# Patient Record
Sex: FEMALE | Race: WHITE | NOT HISPANIC OR LATINO | Employment: FULL TIME | URBAN - METROPOLITAN AREA
[De-identification: names, ages, dates, MRNs, and addresses within clinical notes are randomized per-mention and may not be internally consistent; named-entity substitution may affect disease eponyms.]

---

## 2022-06-07 ENCOUNTER — HOSPITAL ENCOUNTER (EMERGENCY)
Facility: HOSPITAL | Age: 42
Discharge: HOME | End: 2022-06-07
Attending: EMERGENCY MEDICINE
Payer: COMMERCIAL

## 2022-06-07 ENCOUNTER — APPOINTMENT (EMERGENCY)
Dept: CT IMAGING | Facility: HOSPITAL | Age: 42
End: 2022-06-07
Payer: COMMERCIAL

## 2022-06-07 ENCOUNTER — APPOINTMENT (EMERGENCY)
Dept: RADIOLOGY | Facility: HOSPITAL | Age: 42
End: 2022-06-07
Attending: EMERGENCY MEDICINE
Payer: COMMERCIAL

## 2022-06-07 VITALS
DIASTOLIC BLOOD PRESSURE: 78 MMHG | SYSTOLIC BLOOD PRESSURE: 121 MMHG | BODY MASS INDEX: 29.26 KG/M2 | OXYGEN SATURATION: 98 % | TEMPERATURE: 98.1 F | WEIGHT: 159 LBS | RESPIRATION RATE: 18 BRPM | HEIGHT: 62 IN | HEART RATE: 90 BPM

## 2022-06-07 DIAGNOSIS — N20.0 KIDNEY STONE ON LEFT SIDE: ICD-10-CM

## 2022-06-07 DIAGNOSIS — R11.10 VOMITING: ICD-10-CM

## 2022-06-07 DIAGNOSIS — R07.9 CHEST PAIN: Primary | ICD-10-CM

## 2022-06-07 LAB
ALBUMIN SERPL BCP-MCNC: 3.5 G/DL (ref 3.5–5)
ALP SERPL-CCNC: 64 U/L (ref 46–116)
ALT SERPL W P-5'-P-CCNC: 33 U/L (ref 12–78)
ANION GAP SERPL CALCULATED.3IONS-SCNC: 9 MMOL/L (ref 4–13)
AST SERPL W P-5'-P-CCNC: 22 U/L (ref 5–45)
BASOPHILS # BLD AUTO: 0.05 THOUSANDS/ΜL (ref 0–0.1)
BASOPHILS NFR BLD AUTO: 1 % (ref 0–1)
BILIRUB SERPL-MCNC: 0.6 MG/DL (ref 0.2–1)
BUN SERPL-MCNC: 10 MG/DL (ref 5–25)
CALCIUM SERPL-MCNC: 8.5 MG/DL (ref 8.3–10.1)
CARDIAC TROPONIN I PNL SERPL HS: <2 NG/L
CHLORIDE SERPL-SCNC: 106 MMOL/L (ref 100–108)
CO2 SERPL-SCNC: 23 MMOL/L (ref 21–32)
CREAT SERPL-MCNC: 0.8 MG/DL (ref 0.6–1.3)
D DIMER PPP FEU-MCNC: 0.36 UG/ML FEU
EOSINOPHIL # BLD AUTO: 0.08 THOUSAND/ΜL (ref 0–0.61)
EOSINOPHIL NFR BLD AUTO: 2 % (ref 0–6)
ERYTHROCYTE [DISTWIDTH] IN BLOOD BY AUTOMATED COUNT: 13.1 % (ref 11.6–15.1)
GFR SERPL CREATININE-BSD FRML MDRD: 91 ML/MIN/1.73SQ M
GLUCOSE SERPL-MCNC: 102 MG/DL (ref 65–140)
HCT VFR BLD AUTO: 43.7 % (ref 34.8–46.1)
HGB BLD-MCNC: 14 G/DL (ref 11.5–15.4)
IMM GRANULOCYTES # BLD AUTO: 0.01 THOUSAND/UL (ref 0–0.2)
IMM GRANULOCYTES NFR BLD AUTO: 0 % (ref 0–2)
LIPASE SERPL-CCNC: 146 U/L (ref 73–393)
LYMPHOCYTES # BLD AUTO: 1.46 THOUSANDS/ΜL (ref 0.6–4.47)
LYMPHOCYTES NFR BLD AUTO: 32 % (ref 14–44)
MCH RBC QN AUTO: 30 PG (ref 26.8–34.3)
MCHC RBC AUTO-ENTMCNC: 32 G/DL (ref 31.4–37.4)
MCV RBC AUTO: 94 FL (ref 82–98)
MONOCYTES # BLD AUTO: 0.41 THOUSAND/ΜL (ref 0.17–1.22)
MONOCYTES NFR BLD AUTO: 9 % (ref 4–12)
NEUTROPHILS # BLD AUTO: 2.51 THOUSANDS/ΜL (ref 1.85–7.62)
NEUTS SEG NFR BLD AUTO: 56 % (ref 43–75)
NRBC BLD AUTO-RTO: 0 /100 WBCS
PLATELET # BLD AUTO: 324 THOUSANDS/UL (ref 149–390)
PMV BLD AUTO: 9.7 FL (ref 8.9–12.7)
POTASSIUM SERPL-SCNC: 3.8 MMOL/L (ref 3.5–5.3)
PROT SERPL-MCNC: 7.4 G/DL (ref 6.4–8.2)
RBC # BLD AUTO: 4.66 MILLION/UL (ref 3.81–5.12)
SODIUM SERPL-SCNC: 138 MMOL/L (ref 136–145)
WBC # BLD AUTO: 4.52 THOUSAND/UL (ref 4.31–10.16)

## 2022-06-07 PROCEDURE — 71045 X-RAY EXAM CHEST 1 VIEW: CPT

## 2022-06-07 PROCEDURE — 99285 EMERGENCY DEPT VISIT HI MDM: CPT

## 2022-06-07 PROCEDURE — 84484 ASSAY OF TROPONIN QUANT: CPT | Performed by: EMERGENCY MEDICINE

## 2022-06-07 PROCEDURE — 99285 EMERGENCY DEPT VISIT HI MDM: CPT | Performed by: EMERGENCY MEDICINE

## 2022-06-07 PROCEDURE — 80053 COMPREHEN METABOLIC PANEL: CPT | Performed by: EMERGENCY MEDICINE

## 2022-06-07 PROCEDURE — G1004 CDSM NDSC: HCPCS

## 2022-06-07 PROCEDURE — 93005 ELECTROCARDIOGRAM TRACING: CPT

## 2022-06-07 PROCEDURE — 36415 COLL VENOUS BLD VENIPUNCTURE: CPT | Performed by: EMERGENCY MEDICINE

## 2022-06-07 PROCEDURE — 85025 COMPLETE CBC W/AUTO DIFF WBC: CPT | Performed by: EMERGENCY MEDICINE

## 2022-06-07 PROCEDURE — 96374 THER/PROPH/DIAG INJ IV PUSH: CPT

## 2022-06-07 PROCEDURE — 74176 CT ABD & PELVIS W/O CONTRAST: CPT

## 2022-06-07 PROCEDURE — 83690 ASSAY OF LIPASE: CPT | Performed by: EMERGENCY MEDICINE

## 2022-06-07 PROCEDURE — 96361 HYDRATE IV INFUSION ADD-ON: CPT

## 2022-06-07 PROCEDURE — 85379 FIBRIN DEGRADATION QUANT: CPT | Performed by: EMERGENCY MEDICINE

## 2022-06-07 RX ORDER — ONDANSETRON 2 MG/ML
4 INJECTION INTRAMUSCULAR; INTRAVENOUS ONCE
Status: COMPLETED | OUTPATIENT
Start: 2022-06-07 | End: 2022-06-07

## 2022-06-07 RX ADMIN — ONDANSETRON 4 MG: 2 INJECTION INTRAMUSCULAR; INTRAVENOUS at 08:05

## 2022-06-07 RX ADMIN — SODIUM CHLORIDE 1000 ML: 0.9 INJECTION, SOLUTION INTRAVENOUS at 08:05

## 2022-06-07 NOTE — ED PROVIDER NOTES
History  Chief Complaint   Patient presents with    Chest Pain     C/o intermittent sharp/burning L cp since , reports vomiting "light and dark" blood this morning  Denies hx of similar issue  Patient is a 42year old with past medical history significant for hypothyroidism who presents with chest pain  Patient reports that she developed left sided chest pain on , constant since it started, non-radiating, moderate in intensity, sharp in nature without any exacerbating or remitting factors  This morning, she became nauseated and had 1 episode of vomiting that she reports looked dark red/ brown with a streak of red in it  Denies any abdominal pain, diarrhea, palpitations, shortness of breath, lightheadedness, dizziness, leg swelling  None       Past Medical History:   Diagnosis Date    Thyroid disease        Past Surgical History:   Procedure Laterality Date     SECTION      CHOLECYSTECTOMY         History reviewed  No pertinent family history  I have reviewed and agree with the history as documented  E-Cigarette/Vaping    E-Cigarette Use Never User      E-Cigarette/Vaping Substances    Nicotine No      Social History     Tobacco Use    Smoking status: Never Smoker    Smokeless tobacco: Never Used   Vaping Use    Vaping Use: Never used       Review of Systems   Constitutional: Negative for chills and fever  HENT: Negative for congestion and rhinorrhea  Eyes: Negative for photophobia and visual disturbance  Respiratory: Negative for cough and shortness of breath  Cardiovascular: Positive for chest pain  Negative for palpitations and leg swelling  Gastrointestinal: Positive for vomiting  Negative for abdominal pain, constipation, diarrhea and nausea  Genitourinary: Negative for dysuria, flank pain and hematuria  Musculoskeletal: Negative for back pain and neck pain  Skin: Negative for color change and pallor     Neurological: Negative for dizziness, weakness, light-headedness, numbness and headaches  Physical Exam  Physical Exam  Vitals and nursing note reviewed  Constitutional:       General: She is not in acute distress  Appearance: Normal appearance  She is well-developed  She is not ill-appearing, toxic-appearing or diaphoretic  HENT:      Head: Normocephalic and atraumatic  Mouth/Throat:      Mouth: Mucous membranes are moist    Eyes:      Conjunctiva/sclera: Conjunctivae normal       Pupils: Pupils are equal, round, and reactive to light  Cardiovascular:      Rate and Rhythm: Normal rate and regular rhythm  Pulses: Normal pulses  Heart sounds: Normal heart sounds  No murmur heard  Pulmonary:      Effort: Pulmonary effort is normal  No respiratory distress  Breath sounds: Normal breath sounds  No stridor  No wheezing, rhonchi or rales  Chest:      Chest wall: No tenderness  Abdominal:      General: Bowel sounds are normal  There is no distension  Palpations: Abdomen is soft  Tenderness: There is no abdominal tenderness  There is no guarding or rebound  Musculoskeletal:      Cervical back: Neck supple  Right lower leg: No edema  Left lower leg: No edema  Skin:     General: Skin is warm and dry  Neurological:      General: No focal deficit present  Mental Status: She is alert and oriented to person, place, and time  Mental status is at baseline     Psychiatric:         Mood and Affect: Mood normal          Behavior: Behavior normal          Vital Signs  ED Triage Vitals   Temperature Pulse Respirations Blood Pressure SpO2   06/07/22 0757 06/07/22 0756 06/07/22 0756 06/07/22 0756 06/07/22 0757   98 1 °F (36 7 °C) (!) 111 16 119/85 97 %      Temp src Heart Rate Source Patient Position - Orthostatic VS BP Location FiO2 (%)   -- 06/07/22 0756 06/07/22 0830 06/07/22 0756 --    Monitor Sitting Right arm       Pain Score       06/07/22 0920       2           Vitals:    06/07/22 0830 06/07/22 0915 06/07/22 1000 06/07/22 1045   BP: 121/78 124/79 125/84 121/78   Pulse: 96 90 89 90   Patient Position - Orthostatic VS: Sitting Sitting Sitting Lying         Visual Acuity      ED Medications  Medications   sodium chloride 0 9 % bolus 1,000 mL (0 mL Intravenous Stopped 6/7/22 0905)   ondansetron (ZOFRAN) injection 4 mg (4 mg Intravenous Given 6/7/22 0805)       Diagnostic Studies  Results Reviewed     Procedure Component Value Units Date/Time    HS Troponin 0hr (reflex protocol) [[id number]]  (Normal) Collected: 06/07/22 0804    Lab Status: Final result Specimen: Blood from Arm, Right Updated: 06/07/22 0842     hs TnI 0hr <2 ng/L     Lipase [[id number]]  (Normal) Collected: 06/07/22 0804    Lab Status: Final result Specimen: Blood from Arm, Right Updated: 06/07/22 0835     Lipase 146 u/L     Comprehensive metabolic panel [[id number]] Collected: 06/07/22 0804    Lab Status: Final result Specimen: Blood from Arm, Right Updated: 06/07/22 0834     Sodium 138 mmol/L      Potassium 3 8 mmol/L      Chloride 106 mmol/L      CO2 23 mmol/L      ANION GAP 9 mmol/L      BUN 10 mg/dL      Creatinine 0 80 mg/dL      Glucose 102 mg/dL      Calcium 8 5 mg/dL      AST 22 U/L      ALT 33 U/L      Alkaline Phosphatase 64 U/L      Total Protein 7 4 g/dL      Albumin 3 5 g/dL      Total Bilirubin 0 60 mg/dL      eGFR 91 ml/min/1 73sq m     Narrative:      Erasmo guidelines for Chronic Kidney Disease (CKD):     Stage 1 with normal or high GFR (GFR > 90 mL/min/1 73 square meters)    Stage 2 Mild CKD (GFR = 60-89 mL/min/1 73 square meters)    Stage 3A Moderate CKD (GFR = 45-59 mL/min/1 73 square meters)    Stage 3B Moderate CKD (GFR = 30-44 mL/min/1 73 square meters)    Stage 4 Severe CKD (GFR = 15-29 mL/min/1 73 square meters)    Stage 5 End Stage CKD (GFR <15 mL/min/1 73 square meters)  Note: GFR calculation is accurate only with a steady state creatinine    D-Dimer [[id number]]  (Normal) Collected: 06/07/22 0804    Lab Status: Final result Specimen: Blood from Arm, Right Updated: 06/07/22 0827     D-Dimer, Quant 0 36 ug/ml FEU     CBC and differential [[id number]] Collected: 06/07/22 0804    Lab Status: Final result Specimen: Blood from Arm, Right Updated: 06/07/22 0812     WBC 4 52 Thousand/uL      RBC 4 66 Million/uL      Hemoglobin 14 0 g/dL      Hematocrit 43 7 %      MCV 94 fL      MCH 30 0 pg      MCHC 32 0 g/dL      RDW 13 1 %      MPV 9 7 fL      Platelets 899 Thousands/uL      nRBC 0 /100 WBCs      Neutrophils Relative 56 %      Immat GRANS % 0 %      Lymphocytes Relative 32 %      Monocytes Relative 9 %      Eosinophils Relative 2 %      Basophils Relative 1 %      Neutrophils Absolute 2 51 Thousands/µL      Immature Grans Absolute 0 01 Thousand/uL      Lymphocytes Absolute 1 46 Thousands/µL      Monocytes Absolute 0 41 Thousand/µL      Eosinophils Absolute 0 08 Thousand/µL      Basophils Absolute 0 05 Thousands/µL                  CT abdomen pelvis wo contrast   Final Result by Santosh Mayes MD (06/07 0954)   1 2 mm calculus in the left kidney lower pole       No acute intra-abdominal finding  Workstation performed: [id number]         XR chest 1 view portable   Final Result by Barbara Pate MD (06/07 1422)      No active pulmonary disease                    Workstation performed: [id number]                    Procedures  ECG 12 Lead Documentation Only    Date/Time: 6/7/2022 12:32 PM  Performed by: Raymond Garsia DO  Authorized by: Raymond Garsia DO     Indications / Diagnosis:  Cp  ECG reviewed by me, the ED Provider: yes    Patient location:  ED  Previous ECG:     Previous ECG:  Unavailable  Interpretation:     Interpretation: normal    Rate:     ECG rate:  98    ECG rate assessment: normal    Rhythm:     Rhythm: sinus rhythm    Ectopy:     Ectopy: none    QRS:     QRS axis:  Normal    QRS intervals:  Normal  Conduction:     Conduction: normal    ST segments:     ST segments:  Normal  T waves:     T waves: normal    Comments:      qtc 446             ED Course  ED Course as of 06/07/22 1517   Tue Jun 07, 2022   2107 D-Dimer, Quant: 0 36   0843 hs TnI 0hr: <2   0906 Please be aware that you were seen during a time of global shortage of iodinated contrast media  This means an alternative approach to your diagnosis and treatment may have been employed in order to provide optimal care during this shortage  If you have any worsening symptoms, please go to the nearest Emergency Department or call 911 immediately  1004 1 2 mm calculus in the left kidney lower pole      No acute intra-abdominal finding  HEART Risk Score    Flowsheet Row Most Recent Value   Heart Score Risk Calculator    History 0 Filed at: 06/07/2022 1042   ECG 0 Filed at: 06/07/2022 1042   Age 0 Filed at: 06/07/2022 1042   Risk Factors 2 Filed at: 06/07/2022 1042   Troponin 0 Filed at: 06/07/2022 1042   HEART Score 2 Filed at: 06/07/2022 1042                        SBIRT 20yo+    Flowsheet Row Most Recent Value   SBIRT (25 yo +)    In order to provide better care to our patients, we are screening all of our patients for alcohol and drug use  Would it be okay to ask you these screening questions? Yes Filed at: 06/07/2022 2781   Initial Alcohol Screen: US AUDIT-C     1  How often do you have a drink containing alcohol? 0 Filed at: 06/07/2022 0804   2  How many drinks containing alcohol do you have on a typical day you are drinking? 0 Filed at: 06/07/2022 0804   3a  Male UNDER 65: How often do you have five or more drinks on one occasion? 0 Filed at: 06/07/2022 0804   3b  FEMALE Any Age, or MALE 65+: How often do you have 4 or more drinks on one occassion? 0 Filed at: 06/07/2022 0804   Audit-C Score 0 Filed at: 06/07/2022 7941   TODD: How many times in the past year have you    Used an illegal drug or used a prescription medication for non-medical reasons?  Never Filed at: 06/07/2022 8108          Amanda Garcia Criteria for PE    Flowsheet Row Most Recent Value   Aure Criteria for PE    Clinical signs and symptoms of DVT 0 Filed at: 06/07/2022 1509   PE is primary diagnosis or equally likely 0 Filed at: 06/07/2022 1509   HR >100 1 5 Filed at: 06/07/2022 1509   Immobilization at least 3 days or Surgery in the previous 4 weeks 0 Filed at: 06/07/2022 1509   Previous, objectively diagnosed PE or DVT 0 Filed at: 06/07/2022 1509   Hemoptysis 0 Filed at: 06/07/2022 1509   Malignancy with treatment within 6 months or palliative 0 Filed at: 06/07/2022 1509   Aure Criteria Total 1 5 Filed at: 06/07/2022 1509                [facility]  Number of Diagnoses or Management Options  Chest pain  Kidney stone on left side  Vomiting  Diagnosis management comments: Assessment and Plan:   42year old female who presents with chest pain and 1 episode of vomiting  Episode of dark emesis versus char donovan  Hemodynamically stable  Will check labs, imaging  HEART score 2  Troponin negative  D-dimer negative  Abdominal imaging unremarkable  Recommended follow up with PCP and GI PRN  Given strict return precautions including, but not limited to worsening pain, repeat episode of vomiting especially with bright red blood, shortness of breath, abdominal pain, blood in stools, lightheadedness  Disposition  Final diagnoses:   Chest pain   Vomiting   Kidney stone on left side     Time reflects when diagnosis was documented in both MDM as applicable and the Disposition within this note     Time User Action Codes Description Comment    6/7/2022 10:33 AM Arlyne Patrick Add [R07 9] Chest pain     6/7/2022 10:33 AM Arlyne Patrick Add [R11 10] Vomiting     6/7/2022 10:41 AM Arlyne Patrick Add [N20 0] Kidney stone on left side       ED Disposition     ED Disposition   Discharge    Condition   Stable    Date/Time   Tue Jun 7, 2022 10:04 AM    Comment   Pancho Souza discharge to home/self care                 Follow-up Information     Follow up With Specialties Details Why Contact Info Dc Raudel Orozco [address] Emergency Department Emergency Medicine Go to  As needed, If symptoms worsen, for re-evaluation [address] [place],[address] [address] [phone] [address] Emergency Department, [address] [facility], [facility], [facility] [address]    [employer]  Schedule an appointment as soon as possible for a visit in 1 week for re-evaluation [phone]       [facility], [facility]   [address] [place], [place] [address] [facility], [address] [place] [address], [address]  [address] [phone]          There are no discharge medications for this patient  No discharge procedures on file      PDMP Review     None          ED Provider  Electronically Signed by           Ramon Aguilar DO  06/07/22 2755

## 2022-06-07 NOTE — DISCHARGE INSTRUCTIONS
Follow-up with primary care  Return to the emergency department for the following, but not limited to worsening chest pain, palpitations, shortness of breath, persistent vomiting, blood in your vomit, blood in your stool, abdominal pain, fevers    I would also recommend following up with a gastroenterologist

## 2022-06-07 NOTE — ED NOTES
Patient transported to [facility], [address] [place] [address]  06/07/22 7824

## 2022-06-08 LAB
ATRIAL RATE: 98 BPM
P AXIS: 69 DEGREES
PR INTERVAL: 140 MS
QRS AXIS: 8 DEGREES
QRSD INTERVAL: 72 MS
QT INTERVAL: 350 MS
QTC INTERVAL: 446 MS
T WAVE AXIS: 49 DEGREES
VENTRICULAR RATE: 98 BPM

## 2022-06-08 PROCEDURE — 93010 ELECTROCARDIOGRAM REPORT: CPT | Performed by: INTERNAL MEDICINE

## 2023-02-21 ENCOUNTER — OFFICE VISIT (OUTPATIENT)
Dept: URGENT CARE | Facility: CLINIC | Age: 43
End: 2023-02-21

## 2023-02-21 ENCOUNTER — HOSPITAL ENCOUNTER (EMERGENCY)
Facility: HOSPITAL | Age: 43
Discharge: HOME | End: 2023-02-21
Attending: EMERGENCY MEDICINE

## 2023-02-21 VITALS
DIASTOLIC BLOOD PRESSURE: 86 MMHG | SYSTOLIC BLOOD PRESSURE: 137 MMHG | HEIGHT: 61 IN | RESPIRATION RATE: 16 BRPM | HEART RATE: 103 BPM | TEMPERATURE: 98.7 F | OXYGEN SATURATION: 97 % | WEIGHT: 173.72 LBS | BODY MASS INDEX: 32.8 KG/M2

## 2023-02-21 VITALS
DIASTOLIC BLOOD PRESSURE: 86 MMHG | SYSTOLIC BLOOD PRESSURE: 128 MMHG | HEART RATE: 107 BPM | RESPIRATION RATE: 18 BRPM | TEMPERATURE: 99.1 F | OXYGEN SATURATION: 97 %

## 2023-02-21 DIAGNOSIS — R51.9 ACUTE INTRACTABLE HEADACHE, UNSPECIFIED HEADACHE TYPE: Primary | ICD-10-CM

## 2023-02-21 DIAGNOSIS — R51.9 ACUTE NONINTRACTABLE HEADACHE, UNSPECIFIED HEADACHE TYPE: Primary | ICD-10-CM

## 2023-02-21 DIAGNOSIS — R42 DIZZINESS AND GIDDINESS: ICD-10-CM

## 2023-02-21 LAB
ATRIAL RATE: 90 BPM
BILIRUB UR QL STRIP: NEGATIVE
CLARITY UR: CLEAR
COLOR UR: YELLOW
EXT PREGNANCY TEST URINE: NEGATIVE
EXT. CONTROL: NORMAL
GLUCOSE UR STRIP-MCNC: NEGATIVE MG/DL
HGB UR QL STRIP.AUTO: NEGATIVE
KETONES UR STRIP-MCNC: NEGATIVE MG/DL
LEUKOCYTE ESTERASE UR QL STRIP: NEGATIVE
NITRITE UR QL STRIP: NEGATIVE
P AXIS: 66 DEGREES
PH UR STRIP.AUTO: 6 [PH] (ref 4.5–8)
PR INTERVAL: 144 MS
PROT UR STRIP-MCNC: NEGATIVE MG/DL
QRS AXIS: 22 DEGREES
QRSD INTERVAL: 74 MS
QT INTERVAL: 352 MS
QTC INTERVAL: 430 MS
SP GR UR STRIP.AUTO: 1.02 (ref 1–1.03)
T WAVE AXIS: 47 DEGREES
UROBILINOGEN UR QL STRIP.AUTO: 0.2 E.U./DL
VENTRICULAR RATE: 90 BPM

## 2023-02-21 RX ORDER — BUTALBITAL, ACETAMINOPHEN AND CAFFEINE 50; 325; 40 MG/1; MG/1; MG/1
1 TABLET ORAL EVERY 4 HOURS PRN
Qty: 15 TABLET | Refills: 0 | Status: SHIPPED | OUTPATIENT
Start: 2023-02-21 | End: 2023-02-21 | Stop reason: ALTCHOICE

## 2023-02-21 RX ORDER — METOCLOPRAMIDE HYDROCHLORIDE 5 MG/ML
10 INJECTION INTRAMUSCULAR; INTRAVENOUS ONCE
Status: COMPLETED | OUTPATIENT
Start: 2023-02-21 | End: 2023-02-21

## 2023-02-21 RX ORDER — KETOROLAC TROMETHAMINE 30 MG/ML
30 INJECTION, SOLUTION INTRAMUSCULAR; INTRAVENOUS ONCE
Status: COMPLETED | OUTPATIENT
Start: 2023-02-21 | End: 2023-02-21

## 2023-02-21 RX ORDER — DIPHENHYDRAMINE HYDROCHLORIDE 50 MG/ML
25 INJECTION INTRAMUSCULAR; INTRAVENOUS ONCE
Status: COMPLETED | OUTPATIENT
Start: 2023-02-21 | End: 2023-02-21

## 2023-02-21 RX ORDER — MAGNESIUM SULFATE HEPTAHYDRATE 40 MG/ML
2 INJECTION, SOLUTION INTRAVENOUS ONCE
Status: COMPLETED | OUTPATIENT
Start: 2023-02-21 | End: 2023-02-21

## 2023-02-21 RX ORDER — TRAMADOL HYDROCHLORIDE 50 MG/1
50 TABLET ORAL EVERY 6 HOURS PRN
Qty: 10 TABLET | Refills: 0 | Status: SHIPPED | OUTPATIENT
Start: 2023-02-21 | End: 2023-03-03

## 2023-02-21 RX ORDER — LEVOTHYROXINE SODIUM 0.1 MG/1
100 TABLET ORAL DAILY
COMMUNITY

## 2023-02-21 RX ADMIN — METOCLOPRAMIDE 10 MG: 5 INJECTION, SOLUTION INTRAMUSCULAR; INTRAVENOUS at 12:18

## 2023-02-21 RX ADMIN — KETOROLAC TROMETHAMINE 30 MG: 30 INJECTION, SOLUTION INTRAMUSCULAR; INTRAVENOUS at 12:13

## 2023-02-21 RX ADMIN — DIPHENHYDRAMINE HYDROCHLORIDE 25 MG: 50 INJECTION, SOLUTION INTRAMUSCULAR; INTRAVENOUS at 12:15

## 2023-02-21 RX ADMIN — MAGNESIUM SULFATE HEPTAHYDRATE 2 G: 40 INJECTION, SOLUTION INTRAVENOUS at 12:24

## 2023-02-21 RX ADMIN — SODIUM CHLORIDE 1000 ML: 0.9 INJECTION, SOLUTION INTRAVENOUS at 12:12

## 2023-02-21 NOTE — PATIENT INSTRUCTIONS
I am concerned that we do not have everything you need in our [facility] clinic to fully assess what is going on, so I recommend we send you to the emergency room now for further evaluation  When you get there, the emergency room provider(s) will assess you just like I did and decide about further testing based on what they see an how your condition progresses

## 2023-02-21 NOTE — PROGRESS NOTES
[facility]    NAME: Serina Ocasio is a 36 y o  female  : 1980    MRN: [id number]  DATE: 2023  TIME: 9:23 AM    Assessment and Plan   Acute intractable headache, unspecified headache type [R51 9]  1  Acute intractable headache, unspecified headache type  Transfer to other facility      2  Dizziness and giddiness  Transfer to other facility          Patient Instructions   Patient Instructions   I am concerned that we do not have everything you need in our [facility] clinic to fully assess what is going on, so I recommend we send you to the emergency room now for further evaluation  When you get there, the emergency room provider(s) will assess you just like I did and decide about further testing based on what they see an how your condition progresses  Chief Complaint     Chief Complaint   Patient presents with   • Headache     Pt C/O intermittent head pain reported back of the head with B/L hands tingling and blurred vision  Pt tired taking tylenol with no relief  History of Present Illness   Serina Ocasio presents to the clinic c/o  41-year-old female comes in with c/o terrible headache  Started: Around [employer]  and has been unrelenting since  Associated signs and symptoms: Constant pounding throbbing sensation back of head that radiates into her chest and sometimes down arms  Tingling sensation in chest and arms  Nausea, sometimes photophobia, lightheadedness  Night sweats  Blurred vision  Modifying factors: X - strength Tylenol 2 tablets every 4-6 hours  Ice to the back of her head to help sleep  History of headaches but nothing like this  Review of Systems   Review of Systems   Constitutional: Positive for activity change, appetite change, diaphoresis and fatigue  Negative for chills and fever  HENT: Positive for ear pain  Negative for congestion, ear discharge, postnasal drip, rhinorrhea, sinus pressure, sinus pain, sneezing and sore throat  Eyes: Positive for visual disturbance  Blurred vision   Respiratory: Negative  Cardiovascular: Positive for chest pain  Negative for palpitations and leg swelling  Gastrointestinal: Positive for nausea  Negative for abdominal pain, diarrhea and vomiting  Neurological: Positive for dizziness, light-headedness and headaches  Tingling hands       Current Medications     Long-Term Medications   Medication Sig Dispense Refill   • levothyroxine 100 mcg tablet Take 100 mcg by mouth daily         Current Allergies     Allergies as of 2023   • (No Known Allergies)          The following portions of the patient's history were reviewed and updated as appropriate: allergies, current medications, past family history, past medical history, past social history, past surgical history and problem list   Past Medical History:   Diagnosis Date   • Thyroid disease      Past Surgical History:   Procedure Laterality Date   •  SECTION     • CHOLECYSTECTOMY       History reviewed  No pertinent family history  Objective   /86 (BP Location: Left arm, Patient Position: Sitting, Cuff Size: Standard)   Pulse (!) 107   Temp 99 1 °F (37 3 °C) (Tympanic)   Resp 18   SpO2 97%   No LMP recorded  Physical Exam     Physical Exam  Vitals and nursing note reviewed  Constitutional:       General: She is not in acute distress  Appearance: She is not ill-appearing, toxic-appearing or diaphoretic  HENT:      Head: Normocephalic and atraumatic  Right Ear: Tympanic membrane, ear canal and external ear normal       Left Ear: Tympanic membrane, ear canal and external ear normal       Nose: Nose normal  No rhinorrhea  Mouth/Throat:      Mouth: Mucous membranes are moist       Pharynx: No oropharyngeal exudate or posterior oropharyngeal erythema  Eyes:      General: No scleral icterus  Right eye: No discharge  Left eye: No discharge        Extraocular Movements: Extraocular movements intact  Conjunctiva/sclera: Conjunctivae normal       Pupils: Pupils are equal, round, and reactive to light  Cardiovascular:      Rate and Rhythm: Regular rhythm  Tachycardia present  Heart sounds: Normal heart sounds  No murmur heard  No friction rub  No gallop  Pulmonary:      Effort: Pulmonary effort is normal  No respiratory distress  Breath sounds: Normal breath sounds  No stridor  No wheezing, rhonchi or rales  Musculoskeletal:      Cervical back: Normal range of motion and neck supple  No rigidity or tenderness  Lymphadenopathy:      Cervical: No cervical adenopathy  Skin:     General: Skin is warm and dry  Coloration: Skin is not pale  Neurological:      General: No focal deficit present  Mental Status: She is alert and oriented to person, place, and time  Cranial Nerves: No cranial nerve deficit        Coordination: Coordination normal       Gait: Gait normal    Psychiatric:         Mood and Affect: Mood normal          Behavior: Behavior normal

## 2023-02-21 NOTE — ED PROVIDER NOTES
History  Chief Complaint   Patient presents with   • Headache     Pt reports pain that starts in posterior head since Sunday  Pt also reports episodes of CP, hand numbness, and states yesterday at [facility] her leg got numb  79-year-old female with history of hypothyroidism presents to the ED with a 1 week history of headache  She states the headache is in the back of her head and neck region and feels like tightness  Occasionally will have blurred vision  No focal weakness or nausea or vomiting  She states she gets headaches but its never lasted this long  She has been up and going to work and in fact went to [address] yesterday  She went to an urgent care center today and was told to come here for further evaluation  Patient states she took Tylenol without much relief at home  She denies trauma  History provided by:  Patient   used: No    Headache  Pain location:  Occipital  Quality: tight  Radiates to:  Does not radiate  Severity currently:  10/10  Severity at highest:  10/10  Onset quality:  Gradual  Duration:  1 week  Timing:  Constant  Progression:  Unchanged  Chronicity:  New  Similar to prior headaches: no    Worsened by:  Nothing  Ineffective treatments:  Acetaminophen  Associated symptoms: blurred vision and neck pain    Associated symptoms: no abdominal pain, no back pain, no congestion, no cough, no diarrhea, no dizziness (lightheaded), no drainage, no ear pain, no eye pain, no facial pain, no fatigue, no fever, no focal weakness, no hearing loss, no loss of balance, no myalgias, no nausea, no near-syncope, no neck stiffness, no numbness, no paresthesias, no photophobia, no seizures, no sinus pressure, no sore throat, no swollen glands, no syncope, no tingling, no URI, no visual change, no vomiting and no weakness        Prior to Admission Medications   Prescriptions Last Dose Informant Patient Reported?  Taking?   levothyroxine 100 mcg tablet   Yes No   Sig: Take 100 mcg by mouth daily      Facility-Administered Medications: None       Past Medical History:   Diagnosis Date   • Thyroid disease        Past Surgical History:   Procedure Laterality Date   •  SECTION     • CHOLECYSTECTOMY         History reviewed  No pertinent family history  I have reviewed and agree with the history as documented  E-Cigarette/Vaping   • E-Cigarette Use Never User      E-Cigarette/Vaping Substances   • Nicotine No      Social History     Tobacco Use   • Smoking status: Never   • Smokeless tobacco: Never   Vaping Use   • Vaping Use: Never used   Substance Use Topics   • Alcohol use: Not Currently   • Drug use: Never       Review of Systems   Constitutional: Negative  Negative for chills, diaphoresis, fatigue and fever  HENT: Negative  Negative for congestion, ear pain, hearing loss, postnasal drip, rhinorrhea, sinus pressure and sore throat  Eyes: Positive for blurred vision  Negative for photophobia, pain, discharge, redness and itching  Respiratory: Negative  Negative for apnea, cough, chest tightness, shortness of breath and wheezing  Cardiovascular: Negative for chest pain, palpitations, leg swelling, syncope and near-syncope  Gastrointestinal: Negative  Negative for abdominal pain, diarrhea, nausea and vomiting  Endocrine: Negative  Genitourinary: Negative  Negative for flank pain, frequency and urgency  Musculoskeletal: Positive for neck pain  Negative for back pain, myalgias and neck stiffness  Skin: Negative  Allergic/Immunologic: Negative  Neurological: Positive for light-headedness and headaches  Negative for dizziness (lightheaded), tremors, focal weakness, seizures, syncope, facial asymmetry, speech difficulty, weakness, numbness, paresthesias and loss of balance  Hematological: Negative  All other systems reviewed and are negative  Physical Exam  Physical Exam  Vitals and nursing note reviewed  Constitutional:       General: She is awake  She is not in acute distress  Appearance: Normal appearance  She is well-developed and overweight  She is not ill-appearing, toxic-appearing or diaphoretic  HENT:      Head: Normocephalic and atraumatic  Right Ear: Tympanic membrane and external ear normal       Left Ear: Tympanic membrane and external ear normal       Nose: Nose normal       Mouth/Throat:      Mouth: Mucous membranes are moist       Pharynx: No oropharyngeal exudate  Eyes:      General: Lids are normal  No visual field deficit or scleral icterus  Right eye: No discharge  Left eye: No discharge  Extraocular Movements: Extraocular movements intact  Right eye: No nystagmus  Left eye: No nystagmus  Conjunctiva/sclera: Conjunctivae normal       Pupils: Pupils are equal, round, and reactive to light  Neck:      Thyroid: No thyromegaly  Vascular: No JVD  Trachea: No tracheal deviation  Cardiovascular:      Rate and Rhythm: Normal rate and regular rhythm  Heart sounds: Normal heart sounds  No murmur heard  No friction rub  No gallop  Pulmonary:      Effort: Pulmonary effort is normal  No respiratory distress  Breath sounds: Normal breath sounds  No stridor  No wheezing, rhonchi or rales  Chest:      Chest wall: No tenderness  Abdominal:      General: Bowel sounds are normal  There is no distension  Palpations: Abdomen is soft  There is no mass  Tenderness: There is no abdominal tenderness  Hernia: No hernia is present  Musculoskeletal:      Cervical back: Normal range of motion and neck supple  No edema, erythema, rigidity or tenderness  Pain with movement present  No spinous process tenderness or muscular tenderness  Normal range of motion  Lymphadenopathy:      Cervical: No cervical adenopathy  Skin:     General: Skin is warm and dry  Coloration: Skin is not jaundiced or pale  Findings: No bruising, erythema, lesion or rash     Neurological: General: No focal deficit present  Mental Status: She is alert and oriented to person, place, and time  Cranial Nerves: No cranial nerve deficit  Motor: No weakness or abnormal muscle tone  Coordination: Coordination normal       Gait: Gait normal       Deep Tendon Reflexes: Reflexes are normal and symmetric  Reflexes normal    Psychiatric:         Mood and Affect: Mood normal          Behavior: Behavior is cooperative           Vital Signs  ED Triage Vitals [02/21/23 1006]   Temperature Pulse Respirations Blood Pressure SpO2   98 7 °F (37 1 °C) 99 16 126/96 99 %      Temp Source Heart Rate Source Patient Position - Orthostatic VS BP Location FiO2 (%)   Oral Monitor Sitting Right arm --      Pain Score       9           Vitals:    02/21/23 1006 02/21/23 1230   BP: 126/96 115/79   Pulse: 99 95   Patient Position - Orthostatic VS: Sitting Sitting         Visual Acuity      ED Medications  Medications   sodium chloride 0 9 % bolus 1,000 mL (1,000 mL Intravenous New Bag 2/21/23 1212)   diphenhydrAMINE (BENADRYL) injection 25 mg (25 mg Intravenous Given 2/21/23 1215)   metoclopramide (REGLAN) injection 10 mg (10 mg Intravenous Given 2/21/23 1218)   ketorolac (TORADOL) injection 30 mg (30 mg Intravenous Given 2/21/23 1213)   magnesium sulfate 2 g/50 mL IVPB (premix) 2 g (2 g Intravenous New Bag 2/21/23 1224)       Diagnostic Studies  Results Reviewed     Procedure Component Value Units Date/Time    POCT pregnancy, urine [[id number]]  (Normal) Resulted: 02/21/23 1205    Lab Status: Final result Updated: 02/21/23 1206     EXT Preg Test, Ur Negative     Control Valid    Urine Macroscopic, POC [[id number]] Collected: 02/21/23 1204    Lab Status: Final result Specimen: Urine Updated: 02/21/23 1205     Color, UA Yellow     Clarity, UA Clear     pH, UA 6 0     Leukocytes, UA Negative     Nitrite, UA Negative     Protein, UA Negative mg/dl      Glucose, UA Negative mg/dl      Ketones, UA Negative mg/dl Urobilinogen, UA 0 2 E U /dl      Bilirubin, UA Negative     Occult Blood, UA Negative     Specific Gravity, UA 1 020    Narrative:      CLINITEK RESULT                 No orders to display              Procedures  Procedures         ED Course  ED Course as of 02/21/23 1413   Tue Feb 21, 2023   1406 Patient feeling better  Headache resolved  Stable for discharge  Ambulatory to the bathroom without difficulty                                             Medical Decision Making  53-year-old female presents to the ED complaining of a 1 week history of headache  The headache is posterior and involves her neck  She has occasional blurred vision  No nausea, vomiting, fever, trauma  She has been up and around  She went to a store yesterday  She did not go to work today she went to an urgent care center where she was sent here for further evaluation  On exam she is awake and alert laying in a fully lit room in no distress  Her neuro exam is completely normal   No meningeal signs on exam   Low clinical suspicion for meningitis or intracranial hemorrhage as the headache came on gradually and has lasted a week and she has no neurodeficits  Will try migraine cocktail and reassess  Amount and/or Complexity of Data Reviewed  Labs: ordered  Risk  Prescription drug management  Disposition  Final diagnoses:   Acute nonintractable headache, unspecified headache type     Time reflects when diagnosis was documented in both MDM as applicable and the Disposition within this note     Time User Action Codes Description Comment    2/21/2023  2:11 PM Divine DONG Add [R51 9] Acute nonintractable headache, unspecified headache type       ED Disposition     ED Disposition   Discharge    Condition   Good    Date/Time   Tue Feb 21, 2023  2:11 PM    [address] [place] [address] [facility] discharge to home/self care                 Follow-up Information     Follow up With Specialties Details Why Contact Info Additional Cathy [facility]rology [facility] Neurology Schedule an appointment as soon as possible for a visit in 2 days  [address] [place] [address] [address] [place] [id number]  [address] [facility] Neurology [facility], [address] [place] [address] Tone Vargas [place], [place], [id number] [phone]          Patient's Medications   Discharge Prescriptions    BUTALBITAL-ACETAMINOPHEN-CAFFEINE (FIORICET,ESGIC) -40 MG PER TABLET    Take 1 tablet by mouth every 4 (four) hours as needed for migraine or headaches for up to 10 days       Start Date: 2/21/2023 End Date: 3/3/2023       Order Dose: 1 tablet       Quantity: 15 tablet    Refills: 0       No discharge procedures on file      PDMP Review     None          ED Provider  Electronically Signed by           Jyotsna Winter DO  02/21/23 9388

## 2023-08-03 ENCOUNTER — OFFICE VISIT (OUTPATIENT)
Dept: URGENT CARE | Facility: CLINIC | Age: 43
End: 2023-08-03
Payer: COMMERCIAL

## 2023-08-03 VITALS
OXYGEN SATURATION: 97 % | SYSTOLIC BLOOD PRESSURE: 134 MMHG | TEMPERATURE: 97.6 F | RESPIRATION RATE: 18 BRPM | DIASTOLIC BLOOD PRESSURE: 89 MMHG | HEART RATE: 100 BPM

## 2023-08-03 DIAGNOSIS — J06.9 VIRAL URI: Primary | ICD-10-CM

## 2023-08-03 PROCEDURE — 99283 EMERGENCY DEPT VISIT LOW MDM: CPT | Performed by: NURSE PRACTITIONER

## 2023-08-03 PROCEDURE — G0382 LEV 3 HOSP TYPE B ED VISIT: HCPCS | Performed by: NURSE PRACTITIONER

## 2023-08-03 RX ORDER — LIDOCAINE HYDROCHLORIDE 20 MG/ML
15 SOLUTION OROPHARYNGEAL 4 TIMES DAILY PRN
Qty: 200 ML | Refills: 0 | Status: SHIPPED | OUTPATIENT
Start: 2023-08-03

## 2023-08-03 NOTE — PROGRESS NOTES
[facility]        NAME: Enriqueta Landaverde is a 36 y.o. female  : 1980    MRN: [id number]  DATE: August 3, 2023  TIME: 6:00 PM    Assessment and Plan   Viral URI [J06.9]  1. Viral URI  Lidocaine Viscous HCl (XYLOCAINE) 2 % mucosal solution        Start viscous lidocaine for relief of sore throat symptoms. Continue Mucinex. Discussed viral in etiology and will typically resolve around 10 days from beginning. Follow-up with PCP. Discussed vitamin C, vitamin D, zinc to help increase immune system. Salt water gargles to help sore throat. Patient in agreement with plan. Patient Instructions     Follow up with PCP in 3-5 days. Proceed to  ER if symptoms worsen. Chief Complaint     Chief Complaint   Patient presents with   • Cold Like Symptoms     Patient with congestion, stuffy nose, PND and sore throat for 4 days. Taking Mucinex         History of Present Illness   Enriqueta Landaverde presents to the clinic c/o    Cold Like Symptoms (Patient with congestion, stuffy nose, PND and sore throat for 4 days. Taking Mucinex)    Patient with complaints of nasal congestion and cough which started about 4 days ago. Those symptoms have improved greatly. Taking Mucinex which helped the symptoms. Now complains of sore throat and neck discomfort. Feels pain going up into her ears with swallowing. Taking Advil without any relief also has been drinking tea with lemon. Review of Systems   Review of Systems   All other systems reviewed and are negative.         Current Medications     Long-Term Medications   Medication Sig Dispense Refill   • levothyroxine 100 mcg tablet Take 100 mcg by mouth daily         Current Allergies     Allergies as of 2023   • (No Known Allergies)            The following portions of the patient's history were reviewed and updated as appropriate: allergies, current medications, past family history, past medical history, past social history, past surgical history and problem list.    Objective   /89   Pulse 100   Temp 97.6 °F (36.4 °C) (Tympanic)   Resp 18   LMP 06/29/2023 (Approximate)   SpO2 97%        Physical Exam     Physical Exam  Vitals and nursing note reviewed. Constitutional:       Appearance: Normal appearance. She is well-developed. HENT:      Head: Normocephalic and atraumatic. Right Ear: Hearing, ear canal and external ear normal. Tympanic membrane is injected and bulging. Left Ear: Hearing, ear canal and external ear normal. Tympanic membrane is injected and bulging. Nose: Mucosal edema present. No congestion. Right Sinus: No maxillary sinus tenderness or frontal sinus tenderness. Left Sinus: No maxillary sinus tenderness or frontal sinus tenderness. Mouth/Throat:      Lips: Pink. Mouth: Mucous membranes are moist.      Pharynx: Oropharynx is clear. Comments: pnd  Eyes:      General: Lids are normal.      Conjunctiva/sclera: Conjunctivae normal.   Cardiovascular:      Rate and Rhythm: Normal rate and regular rhythm. Heart sounds: Normal heart sounds, S1 normal and S2 normal.   Pulmonary:      Effort: Pulmonary effort is normal.      Breath sounds: Normal breath sounds. Lymphadenopathy:      Cervical: Cervical adenopathy present. Skin:     General: Skin is warm and dry. Neurological:      Mental Status: She is alert and oriented to person, place, and time. Psychiatric:         Speech: Speech normal.         Behavior: Behavior normal. Behavior is cooperative. Thought Content:  Thought content normal.         Judgment: Judgment normal.

## 2023-08-03 NOTE — PATIENT INSTRUCTIONS
Viral Syndrome   AMBULATORY CARE:   Viral syndrome  is a term used for symptoms of an infection caused by a virus. Viruses are spread easily from person to person on shared items. Signs and symptoms  may start slowly or suddenly and last hours to days. They can be mild to severe and can change over days or hours. You may have any of the following:  Fever and chills    A runny or stuffy nose    Cough, sore throat, or hoarseness    Headache, or pain and pressure around your eyes    Muscle aches and joint pain    Shortness of breath or wheezing    Abdominal pain, cramps, and diarrhea    Nausea, vomiting, or loss of appetite    Call your local emergency number (911 in the [address]), or have someone call if:   You have a seizure. You cannot be woken. You have chest pain or trouble breathing. Seek care immediately if:   You have a stiff neck, a bad headache, and sensitivity to light. You feel weak, dizzy, or confused. You stop urinating or urinate a lot less than usual.    You cough up blood. You have severe abdominal pain or your abdomen is larger than usual.    Call your doctor if:   Your symptoms do not get better with treatment or get worse after 10 days. You have a rash or ear pain. You have burning when you urinate. You have questions or concerns about your condition or care. Treatment for viral syndrome  may include medicines to manage your symptoms. Antibiotics are not given for a viral infection. You may  need any of the following:  Acetaminophen  decreases pain and fever. It is available without a doctor's order. Ask how much to take and how often to take it. Follow directions. Read the labels of all other medicines you are using to see if they also contain acetaminophen, or ask your doctor or pharmacist. Acetaminophen can cause liver damage if not taken correctly. NSAIDs , such as ibuprofen, help decrease swelling, pain, and fever.  NSAIDs can cause stomach bleeding or kidney problems in certain people. If you take blood thinner medicine, always ask your healthcare provider if NSAIDs are safe for you. Always read the medicine label and follow directions. Cold medicine  helps decrease swelling, control a cough, and relieve chest or nasal congestion. Saline nasal spray  helps decrease nasal congestion. Manage your symptoms:   Drink liquids as directed to prevent dehydration. Ask how much liquid to drink each day and which liquids are best for you. Do not drink liquids with caffeine. Caffeine can make dehydration worse. Get plenty of rest to help your body heal.  Take naps throughout the day. Ask your healthcare provider when you can return to work and your normal activities. Use a cool mist humidifier  to increase air moisture in your home. This may make it easier for you to breathe and help decrease your cough. Drink tea with honey or use cough drops for a sore throat. Cough drops are available without a doctor's order. Follow directions for taking cough drops. Do not smoke or be close to anyone who is smoking. Nicotine and other chemicals in cigarettes and cigars can cause lung damage. Smoking can also delay healing. Ask your healthcare provider for information if you currently smoke and need help to quit. E-cigarettes or smokeless tobacco still contain nicotine. Talk to your healthcare provider before you use these products. Prevent the spread of germs:   Wash your hands often throughout the day. Use soap and water. Rub your soapy hands together, lacing your fingers, for at least 20 seconds. Rinse with warm, running water. Dry your hands with a clean towel or paper towel. Use hand  that contains alcohol if soap and water are not available. Teach children how to wash their hands and use hand . Cover sneezes and coughs. Turn your face away and cover your mouth and nose with a tissue. Throw the tissue away.  Use the bend of your arm if a tissue is not available. Then wash your hands well with soap and water or use hand . Teach children how to cover a cough or sneeze. Stay home while you are sick. Avoid crowds as much as possible. Get the influenza (flu) vaccine as soon as recommended each year. The flu vaccine is available starting in September or October. Ask your healthcare provider about the pneumonia vaccine. This vaccine is usually recommended every 5 years in older adults. Follow up with your doctor as directed:  Write down your questions so you remember to ask them during your visits. © Copyright [facility] 2022 Information is for End User's use only and may not be sold, redistributed or otherwise used for commercial purposes. The above information is an  only. It is not intended as medical advice for individual conditions or treatments. Talk to your doctor, nurse or pharmacist before following any medical regimen to see if it is safe and effective for you.

## 2023-08-04 ENCOUNTER — TELEPHONE (OUTPATIENT)
Dept: URGENT CARE | Facility: CLINIC | Age: 43
End: 2023-08-04

## 2023-08-04 NOTE — TELEPHONE ENCOUNTER
Spoke with patient after she had left her phone number for provider to call. Pharmacy says they have not been able to get medication. Provider informs her that that seems to be common problem at other pharmacies as well. Recommend warm salt water gargles every 1-2 hours as well as Chloraseptic spray for comfort as needed. She expressed understanding.

## 2023-08-29 ENCOUNTER — OFFICE VISIT (OUTPATIENT)
Dept: FAMILY MEDICINE CLINIC | Facility: CLINIC | Age: 43
End: 2023-08-29
Payer: COMMERCIAL

## 2023-08-29 VITALS
SYSTOLIC BLOOD PRESSURE: 110 MMHG | TEMPERATURE: 97.3 F | HEART RATE: 102 BPM | BODY MASS INDEX: 31.57 KG/M2 | DIASTOLIC BLOOD PRESSURE: 82 MMHG | HEIGHT: 63 IN | OXYGEN SATURATION: 100 % | WEIGHT: 178.2 LBS

## 2023-08-29 DIAGNOSIS — Z12.31 ENCOUNTER FOR SCREENING MAMMOGRAM FOR BREAST CANCER: ICD-10-CM

## 2023-08-29 DIAGNOSIS — Z13.6 SCREENING FOR CARDIOVASCULAR CONDITION: ICD-10-CM

## 2023-08-29 DIAGNOSIS — Z11.4 SCREENING FOR HIV (HUMAN IMMUNODEFICIENCY VIRUS): ICD-10-CM

## 2023-08-29 DIAGNOSIS — Z11.59 NEED FOR HEPATITIS C SCREENING TEST: ICD-10-CM

## 2023-08-29 DIAGNOSIS — Z00.00 ANNUAL PHYSICAL EXAM: Primary | ICD-10-CM

## 2023-08-29 DIAGNOSIS — E55.9 VITAMIN D DEFICIENCY: ICD-10-CM

## 2023-08-29 DIAGNOSIS — R51.9 INTRACTABLE EPISODIC HEADACHE, UNSPECIFIED HEADACHE TYPE: ICD-10-CM

## 2023-08-29 PROCEDURE — 99386 PREV VISIT NEW AGE 40-64: CPT | Performed by: FAMILY MEDICINE

## 2023-08-29 NOTE — PROGRESS NOTES
Subjective:    HPI  Sprakle Estevez is a 39 y.o. female here today for:  Chief Complaint   Patient presents with   • Establish Care     Patient here today to establish care she states that she ashimotos and in the past 6 months she has not taken the synthroid 100 mcg, she did not have insurance. Charley Hernandez ---Above per clinical staff & reviewed. ---  HPI:  38yof here to establish  Pt states she has not been to provider for quite some time  Has been off her thyroid medication for at least 6 months  Has had headaches- seen in urgent care and ER, headache resolved with medication  Was given meds for headaches at that time- pt upset no scan was done  Explained her exam was normal and the headache resolved  History of Hashimoto's- does not recall what labs were done and we do not have records  Last TSH on record is normal in her chart  Will try and get records to review  Health maintenance reviewed  Healthy eating and exercise reviewed  PHQ and MARION reviewed    PHQ-2/9 Depression Screening    Little interest or pleasure in doing things: 1 - several days  Feeling down, depressed, or hopeless: 1 - several days  Trouble falling or staying asleep, or sleeping too much: 1 - several days  Feeling tired or having little energy: 3 - nearly every day  Poor appetite or overeating: 3 - nearly every day  Feeling bad about yourself - or that you are a failure or have let yourself or your family down: 1 - several days  Trouble concentrating on things, such as reading the newspaper or watching television: 1 - several days  Moving or speaking so slowly that other people could have noticed.  Or the opposite - being so fidgety or restless that you have been moving around a lot more than usual: 0 - not at all  Thoughts that you would be better off dead, or of hurting yourself in some way: 0 - not at all  PHQ-2 Score: 2  PHQ-2 Interpretation: Negative depression screen  PHQ-9 Score: 11   PHQ-9 Interpretation: Moderate depression           MARION-7 Flowsheet Screening    Flowsheet Row Most Recent Value   Over the last 2 weeks, how often have you been bothered by any of the following problems?     Feeling nervous, anxious, or on edge 1   Not being able to stop or control worrying 1   Worrying too much about different things 1   Trouble relaxing 1   Being so restless that it is hard to sit still 1   Becoming easily annoyed or irritable 1   Feeling afraid as if something awful might happen 1   MARION-7 Total Score 7            The following portions of the patient's history were reviewed and updated as appropriate: allergies, current medications, past family history, past medical history, past social history, past surgical history and problem list.    Past Medical History:   Diagnosis Date   • Thyroid disease        Past Surgical History:   Procedure Laterality Date   •  SECTION     • CHOLECYSTECTOMY         Social History     Socioeconomic History   • Marital status: Single     Spouse name: None   • Number of children: None   • Years of education: None   • Highest education level: High school graduate   Occupational History   • None   Tobacco Use   • Smoking status: Never   • Smokeless tobacco: Never   Vaping Use   • Vaping Use: Never used   Substance and Sexual Activity   • Alcohol use: Not Currently   • Drug use: Never   • Sexual activity: Yes     Birth control/protection: None   Other Topics Concern   • None   Social History Narrative    Lives at home with ,  for 3 years    2 grown children- all in [place]- 20yo son, 26yo son    Marilyn Ramirez old lives with father in [facility]    Work-  at [employer] recently from [facility] to [address] [place] [address] Determinants of Health     Financial Resource Strain: Not on [employer] Insecurity: Not on file   Transportation Needs: Not on file   Physical Activity: Not on file   Stress: Not on file   Social Connections: Not on file   Intimate Partner Violence: Not on file   Housing Stability: Not on file       Current Outpatient Medications   Medication Sig Dispense Refill   • levothyroxine 100 mcg tablet Take 100 mcg by mouth daily (Patient not taking: Reported on 8/29/2023)     • Lidocaine Viscous HCl (XYLOCAINE) 2 % mucosal solution Swish and spit 15 mL 4 (four) times a day as needed for mouth pain or discomfort or mucositis 200 mL 0     No current facility-administered medications for this visit. Review of Systems   Constitutional: Positive for fatigue. Negative for chills and fever. HENT: Negative. Negative for ear pain and sore throat. Eyes: Negative for pain and visual disturbance. Respiratory: Negative. Negative for cough and shortness of breath. Cardiovascular: Negative. Negative for chest pain and palpitations. Gastrointestinal: Negative. Negative for abdominal pain and vomiting. Genitourinary: Negative. Negative for dysuria and hematuria. Musculoskeletal: Negative for arthralgias and back pain. Skin: Negative for color change and rash. Neurological: Positive for headaches. Negative for seizures and syncope. Psychiatric/Behavioral: The patient is nervous/anxious.          Objective:    /82 (BP Location: Left arm, Patient Position: Sitting, Cuff Size: Adult)   Pulse 102   Temp (!) 97.3 °F (36.3 °C) (Temporal)   Ht 5' 2.6" (1.59 m)   Wt 80.8 kg (178 lb 3.2 oz)   LMP 08/27/2023 (Approximate)   SpO2 100%   BMI 31.97 kg/m²   Wt Readings from Last 3 Encounters:   08/29/23 80.8 kg (178 lb 3.2 oz)   02/21/23 78.8 kg (173 lb 11.6 oz)   06/07/22 72.1 kg (159 lb)     BP Readings from Last 3 Encounters:   08/29/23 110/82   08/03/23 134/89   02/21/23 137/86       Lab Results   Component Value Date    WBC 4.52 06/07/2022    HGB 14.0 06/07/2022    HCT 43.7 06/07/2022     06/07/2022    ALT 33 06/07/2022    AST 22 06/07/2022    K 3.8 06/07/2022     06/07/2022    CREATININE 0.80 06/07/2022    BUN 10 06/07/2022    CO2 23 06/07/2022       Physical Exam  Vitals and nursing note reviewed. Constitutional:       Appearance: Normal appearance. She is well-developed. HENT:      Head: Normocephalic and atraumatic. Right Ear: Tympanic membrane and external ear normal.      Left Ear: Tympanic membrane and external ear normal.      Nose: Nose normal.      Mouth/Throat:      Mouth: Mucous membranes are moist.   Eyes:      Conjunctiva/sclera: Conjunctivae normal.      Pupils: Pupils are equal, round, and reactive to light. Neck:      Thyroid: No thyromegaly. Comments: No carotid bruits  Cardiovascular:      Rate and Rhythm: Normal rate and regular rhythm. Heart sounds: Normal heart sounds. No murmur heard. Pulmonary:      Effort: Pulmonary effort is normal. No respiratory distress. Breath sounds: Normal breath sounds. Abdominal:      General: Bowel sounds are normal. There is no distension. Palpations: Abdomen is soft. Tenderness: There is no abdominal tenderness. Musculoskeletal:         General: Normal range of motion. Cervical back: Normal range of motion and neck supple. Skin:     General: Skin is warm. Capillary Refill: Capillary refill takes less than 2 seconds. Neurological:      Mental Status: She is alert and oriented to person, place, and time. Cranial Nerves: No cranial nerve deficit. Psychiatric:         Mood and Affect: Mood normal.         Thought Content: Thought content normal.               BMI Counseling: Body mass index is 31.97 kg/m². The BMI is above normal. Nutrition recommendations include moderation in carbohydrate intake and increasing intake of lean protein. Exercise recommendations include moderate physical activity 150 minutes/week. No pharmacotherapy was ordered. Patient referred to PCP. Rationale for BMI follow-up plan is due to patient being overweight or obese. Depression Screening and Follow-up Plan: Patient was screened for depression during today's encounter.  They screened negative with a PHQ-2 score of 2. Assessment/Plan:   Zulema Miller was seen today for establish care. Diagnoses and all orders for this visit:    Annual physical exam  -     Lipid panel; Future  -     CBC and Platelet; Future  -     Comprehensive metabolic panel; Future  -     TSH, 3rd generation with Free T4 reflex; Future  -     Vitamin D 25 hydroxy; Future  -     Lipid panel  -     CBC and Platelet  -     Comprehensive metabolic panel  -     TSH, 3rd generation with Free T4 reflex  -     Vitamin D 25 hydroxy    Encounter for screening mammogram for breast cancer  -     Mammo screening bilateral w 3d & cad; Future  -     Mammo screening bilateral w 3d & cad; Future  -     Lipid panel; Future  -     CBC and Platelet; Future  -     Comprehensive metabolic panel; Future  -     TSH, 3rd generation with Free T4 reflex; Future  -     Vitamin D 25 hydroxy; Future  -     Lipid panel  -     CBC and Platelet  -     Comprehensive metabolic panel  -     TSH, 3rd generation with Free T4 reflex  -     Vitamin D 25 hydroxy    Screening for cardiovascular condition  -     Lipid panel; Future  -     CBC and Platelet; Future  -     Comprehensive metabolic panel; Future  -     TSH, 3rd generation with Free T4 reflex; Future  -     Vitamin D 25 hydroxy; Future  -     Lipid panel  -     CBC and Platelet  -     Comprehensive metabolic panel  -     TSH, 3rd generation with Free T4 reflex  -     Vitamin D 25 hydroxy    Screening for HIV (human immunodeficiency virus)  -     Lipid panel; Future  -     HIV 1/2 AB/AG w Reflex [facility] for 2 yr old and above; Future  -     CBC and Platelet; Future  -     Comprehensive metabolic panel; Future  -     TSH, 3rd generation with Free T4 reflex; Future  -     Vitamin D 25 hydroxy;  Future  -     Lipid panel  -     CBC and Platelet  -     Comprehensive metabolic panel  -     TSH, 3rd generation with Free T4 reflex  -     Vitamin D 25 hydroxy    Need for hepatitis C screening test  - Lipid panel; Future  -     Hepatitis C antibody; Future  -     CBC and Platelet; Future  -     Comprehensive metabolic panel; Future  -     TSH, 3rd generation with Free T4 reflex; Future  -     Vitamin D 25 hydroxy; Future  -     Lipid panel  -     CBC and Platelet  -     Comprehensive metabolic panel  -     TSH, 3rd generation with Free T4 reflex  -     Vitamin D 25 hydroxy    Vitamin D deficiency  -     Lipid panel; Future  -     CBC and Platelet; Future  -     Comprehensive metabolic panel; Future  -     TSH, 3rd generation with Free T4 reflex; Future  -     Vitamin D 25 hydroxy; Future  -     Lipid panel  -     CBC and Platelet  -     Comprehensive metabolic panel  -     TSH, 3rd generation with Free T4 reflex  -     Vitamin D 25 hydroxy    Intractable episodic headache, unspecified headache type      Return in about 1 year (around 8/29/2024) for Annual physical.  Patient Instructions     Please get your labwork done fasting. Do not eat/drink for about 8-12 hours prior to getting the labwork done, however you may drink water or black coffee while you are fasting. Please [facility] a [facility] lab if possible, as we receive the lab results more quickly. We will notify you of your labwork results even if they are normal.  Please contact us if you do not hear about your lab results after one week. Low Carb Recommendations:  Please follow a low carbohydrate, high protein diet. Pharmaxis is a good dwight/computer program to keep food logs. Your meals should be less than 30 grams of carbohydrates. Your snacks should be less than 15 grams of carbohydrates. You should drink at least 85 ounces of water daily. You should walk at least 30 minutes daily. Yearly exams recommended. Wellness Visit for Adults   AMBULATORY CARE:   A wellness visit  is when you see your healthcare provider to get screened for health problems. Your healthcare provider will also give you advice on how to stay healthy.  Write down your questions so you remember to ask them. Ask your healthcare provider how often you should have a wellness visit. What happens at a wellness visit:  Your healthcare provider will ask about your health, and your family history of health problems. This includes high blood pressure, heart disease, and cancer. He or she will ask if you have symptoms that concern you, if you smoke, and about your mood. You may also be asked about your intake of medicines, supplements, food, and alcohol. Any of the following may be done:  • Your weight  will be checked. Your height may also be checked so your body mass index (BMI) can be calculated. Your BMI shows if you are at a healthy weight. • Your blood pressure  and heart rate will be checked. Your temperature may also be checked. • Blood and urine tests  may be done. Blood tests may be done to check your cholesterol levels. Abnormal cholesterol levels increase your risk for heart disease and stroke. You may also need a blood or urine test to check for diabetes if you are at increased risk. Urine tests may be done to look for signs of an infection or kidney disease. • A physical exam  includes checking your heartbeat and lungs with a stethoscope. Your healthcare provider may also check your skin to look for sun damage. • Screening tests  may be recommended. A screening test is done to check for diseases that may not cause symptoms. The screening tests you may need depend on your age, gender, family history, and lifestyle habits. For example, colorectal screening may be recommended if you are 48years old or older. Screening tests you need if you are a woman:   • A Pap smear  is used to screen for cervical cancer. Pap smears are usually done every 3 to 5 years depending on your age. You may need them more often if you have had abnormal Pap smear test results in the past. Ask your healthcare provider how often you should have a Pap smear.     • A mammogram  is an x-ray of your breasts to screen for breast cancer. Experts recommend mammograms every 2 years starting at age 48 years. You may need a mammogram at age 52 years or younger if you have an increased risk for breast cancer. Talk to your healthcare provider about when you should start having mammograms and how often you need them. Vaccines you may need:   • Get an influenza vaccine  every year. The influenza vaccine protects you from the flu. Several types of viruses cause the flu. The viruses change over time, so new vaccines are made each year. • Get a tetanus-diphtheria (Td) booster vaccine  every 10 years. This vaccine protects you against tetanus and diphtheria. Tetanus is a severe infection that may cause painful muscle spasms and lockjaw. Diphtheria is a severe bacterial infection that causes a thick covering in the back of your mouth and throat. • Get a human papillomavirus (HPV) vaccine  if you are female and aged 23 to 32 or male 23 to 24 and never received it. This vaccine protects you from HPV infection. HPV is the most common infection spread by sexual contact. HPV may also cause vaginal, penile, and anal cancers. • Get a pneumococcal vaccine  if you are aged 72 years or older. The pneumococcal vaccine is an injection given to protect you from pneumococcal disease. Pneumococcal disease is an infection caused by pneumococcal bacteria. The infection may cause pneumonia, meningitis, or an ear infection. • Get a shingles vaccine  if you are 60 or older, even if you have had shingles before. The shingles vaccine is an injection to protect you from the varicella-zoster virus. This is the same virus that causes chickenpox. Shingles is a painful rash that develops in people who had chickenpox or have been exposed to the virus. How to eat healthy:  My Plate is a model for planning healthy meals. It shows the types and amounts of foods that should go on your plate.  Fruits and vegetables make up about half of your plate, and grains and protein make up the other half. A serving of dairy is included on the side of your plate. The amount of calories and serving sizes you need depends on your age, gender, weight, and height. Examples of healthy foods are listed below:  • Eat a variety of vegetables  such as dark green, red, and orange vegetables. You can also include canned vegetables low in sodium (salt) and frozen vegetables without added butter or sauces. • Eat a variety of fresh fruits , canned fruit in 100% juice, frozen fruit, and dried fruit. • Include whole grains. At least half of the grains you eat should be whole grains. Examples include whole-wheat bread, wheat pasta, brown rice, and whole-grain cereals such as oatmeal.    • Eat a variety of protein foods such as seafood (fish and shellfish), lean meat, and poultry without skin (turkey and chicken). Examples of lean meats include pork leg, shoulder, or tenderloin, and beef round, sirloin, tenderloin, and extra lean ground beef. Other protein foods include eggs and egg substitutes, beans, peas, soy products, nuts, and seeds. • Choose low-fat dairy products such as skim or 1% milk or low-fat yogurt, cheese, and cottage cheese. • Limit unhealthy fats  such as butter, hard margarine, and shortening. Exercise:  Exercise at least 30 minutes per day on most days of the week. Some examples of exercise include walking, biking, dancing, and swimming. You can also fit in more physical activity by taking the stairs instead of the elevator or parking farther away from stores. Include muscle strengthening activities 2 days each week. Regular exercise provides many health benefits. It helps you manage your weight, and decreases your risk for type 2 diabetes, heart disease, stroke, and high blood pressure. Exercise can also help improve your mood. Ask your healthcare provider about the best exercise plan for you.        General health and safety guidelines:   • Do not smoke. Nicotine and other chemicals in cigarettes and cigars can cause lung damage. Ask your healthcare provider for information if you currently smoke and need help to quit. E-cigarettes or smokeless tobacco still contain nicotine. Talk to your healthcare provider before you use these products. • Limit alcohol. A drink of alcohol is 12 ounces of beer, 5 ounces of wine, or 1½ ounces of liquor. • Lose weight, if needed. Being overweight increases your risk of certain health conditions. These include heart disease, high blood pressure, type 2 diabetes, and certain types of cancer. • Protect your skin. Do not sunbathe or use tanning beds. Use sunscreen with a SPF 15 or higher. Apply sunscreen at least 15 minutes before you go outside. Reapply sunscreen every 2 hours. Wear protective clothing, hats, and sunglasses when you are outside. • Drive safely. Always wear your seatbelt. Make sure everyone in your car wears a seatbelt. A seatbelt can save your life if you are in an accident. Do not use your cell phone when you are driving. This could distract you and cause an accident. Pull over if you need to make a call or send a text message. • Practice safe sex. Use latex condoms if are sexually active and have more than one partner. Your healthcare provider may recommend screening tests for sexually transmitted infections (STIs). • Wear helmets, lifejackets, and protective gear. Always wear a helmet when you ride a bike or motorcycle, go skiing, or play sports that could cause a head injury. Wear protective equipment when you play sports. Wear a lifejacket when you are on a boat or doing water sports. © Copyright Yessica Mcmillan 2022 Information is for End User's use only and may not be sold, redistributed or otherwise used for commercial purposes. The above information is an  only. It is not intended as medical advice for individual conditions or treatments.  Talk to your doctor, nurse or pharmacist before following any medical regimen to see if it is safe and effective for you.

## 2023-08-29 NOTE — PATIENT INSTRUCTIONS
Please get your labwork done fasting. Do not eat/drink for about 8-12 hours prior to getting the labwork done, however you may drink water or black coffee while you are fasting. Please use a [facility] lab if possible, as we receive the lab results more quickly. We will notify you of your labwork results even if they are normal.  Please contact us if you do not hear about your lab results after one week. Low Carb Recommendations:  Please follow a low carbohydrate, high protein diet. [email or web address] is a good dwight/computer program to keep food logs. Your meals should be less than 30 grams of carbohydrates. Your snacks should be less than 15 grams of carbohydrates. You should drink at least 85 ounces of water daily. You should walk at least 30 minutes daily. Yearly exams recommended. Wellness Visit for Adults   AMBULATORY CARE:   A wellness visit  is when you see your healthcare provider to get screened for health problems. Your healthcare provider will also give you advice on how to stay healthy. Write down your questions so you remember to ask them. Ask your healthcare provider how often you should have a wellness visit. What happens at a wellness visit:  Your healthcare provider will ask about your health, and your family history of health problems. This includes high blood pressure, heart disease, and cancer. He or she will ask if you have symptoms that concern you, if you smoke, and about your mood. You may also be asked about your intake of medicines, supplements, food, and alcohol. Any of the following may be done: Your weight  will be checked. Your height may also be checked so your body mass index (BMI) can be calculated. Your BMI shows if you are at a healthy weight. Your blood pressure  and heart rate will be checked. Your temperature may also be checked. Blood and urine tests  may be done. Blood tests may be done to check your cholesterol levels.  Abnormal cholesterol levels increase your risk for heart disease and stroke. You may also need a blood or urine test to check for diabetes if you are at increased risk. Urine tests may be done to look for signs of an infection or kidney disease. A physical exam  includes checking your heartbeat and lungs with a stethoscope. Your healthcare provider may also check your skin to look for sun damage. Screening tests  may be recommended. A screening test is done to check for diseases that may not cause symptoms. The screening tests you may need depend on your age, gender, family history, and lifestyle habits. For example, colorectal screening may be recommended if you are 48years old or older. Screening tests you need if you are a woman:   A Pap smear  is used to screen for cervical cancer. Pap smears are usually done every 3 to 5 years depending on your age. You may need them more often if you have had abnormal Pap smear test results in the past. Ask your healthcare provider how often you should have a Pap smear. A mammogram  is an x-ray of your breasts to screen for breast cancer. Experts recommend mammograms every 2 years starting at age 48 years. You may need a mammogram at age 52 years or younger if you have an increased risk for breast cancer. Talk to your healthcare provider about when you should start having mammograms and how often you need them. Vaccines you may need:   Get an influenza vaccine  every year. The influenza vaccine protects you from the flu. Several types of viruses cause the flu. The viruses change over time, so new vaccines are made each year. Get a tetanus-diphtheria (Td) booster vaccine  every 10 years. This vaccine protects you against tetanus and diphtheria. Tetanus is a severe infection that may cause painful muscle spasms and lockjaw. Diphtheria is a severe bacterial infection that causes a thick covering in the back of your mouth and throat.     Get a human papillomavirus (HPV) vaccine  if you are female and aged 23 to 32 or male 23 to 24 and never received it. This vaccine protects you from HPV infection. HPV is the most common infection spread by sexual contact. HPV may also cause vaginal, penile, and anal cancers. Get a pneumococcal vaccine  if you are aged 72 years or older. The pneumococcal vaccine is an injection given to protect you from pneumococcal disease. Pneumococcal disease is an infection caused by pneumococcal bacteria. The infection may cause pneumonia, meningitis, or an ear infection. Get a shingles vaccine  if you are 60 or older, even if you have had shingles before. The shingles vaccine is an injection to protect you from the varicella-zoster virus. This is the same virus that causes chickenpox. Shingles is a painful rash that develops in people who had chickenpox or have been exposed to the virus. How to eat healthy:  My Plate is a model for planning healthy meals. It shows the types and amounts of foods that should go on your plate. Fruits and vegetables make up about half of your plate, and grains and protein make up the other half. A serving of dairy is included on the side of your plate. The amount of calories and serving sizes you need depends on your age, gender, weight, and height. Examples of healthy foods are listed below:  Eat a variety of vegetables  such as dark green, red, and orange vegetables. You can also include canned vegetables low in sodium (salt) and frozen vegetables without added butter or sauces. Eat a variety of fresh fruits , canned fruit in 100% juice, frozen fruit, and dried fruit. Include whole grains. At least half of the grains you eat should be whole grains. Examples include whole-wheat bread, wheat pasta, brown rice, and whole-grain cereals such as oatmeal.    Eat a variety of protein foods such as seafood (fish and shellfish), lean meat, and poultry without skin (turkey and chicken).  Examples of lean meats include pork leg, shoulder, or tenderloin, and beef round, sirloin, tenderloin, and extra lean ground beef. Other protein foods include eggs and egg substitutes, beans, peas, soy products, nuts, and seeds. Choose low-fat dairy products such as skim or 1% milk or low-fat yogurt, cheese, and cottage cheese. Limit unhealthy fats  such as butter, hard margarine, and shortening. Exercise:  Exercise at least 30 minutes per day on most days of the week. Some examples of exercise include walking, biking, dancing, and swimming. You can also fit in more physical activity by taking the stairs instead of the elevator or parking farther away from stores. Include muscle strengthening activities 2 days each week. Regular exercise provides many health benefits. It helps you manage your weight, and decreases your risk for type 2 diabetes, heart disease, stroke, and high blood pressure. Exercise can also help improve your mood. Ask your healthcare provider about the best exercise plan for you. General health and safety guidelines:   Do not smoke. Nicotine and other chemicals in cigarettes and cigars can cause lung damage. Ask your healthcare provider for information if you currently smoke and need help to quit. E-cigarettes or smokeless tobacco still contain nicotine. Talk to your healthcare provider before you use these products. Limit alcohol. A drink of alcohol is 12 ounces of beer, 5 ounces of wine, or 1½ ounces of liquor. Lose weight, if needed. Being overweight increases your risk of certain health conditions. These include heart disease, high blood pressure, type 2 diabetes, and certain types of cancer. Protect your skin. Do not sunbathe or use tanning beds. Use sunscreen with a SPF 15 or higher. Apply sunscreen at least 15 minutes before you go outside. Reapply sunscreen every 2 hours. Wear protective clothing, hats, and sunglasses when you are outside. Drive safely. Always wear your seatbelt.  Make sure everyone in your car wears a seatbelt. A seatbelt can save your life if you are in an accident. Do not use your cell phone when you are driving. This could distract you and cause an accident. Pull over if you need to make a call or send a text message. Practice safe sex. Use latex condoms if are sexually active and have more than one partner. Your healthcare provider may recommend screening tests for sexually transmitted infections (STIs). Wear helmets, lifejackets, and protective gear. Always wear a helmet when you ride a bike or motorcycle, go skiing, or play sports that could cause a head injury. Wear protective equipment when you play sports. Wear a lifejacket when you are on a boat or doing water sports. © Copyright [facility] 2022 Information is for End User's use only and may not be sold, redistributed or otherwise used for commercial purposes. The above information is an  only. It is not intended as medical advice for individual conditions or treatments. Talk to your doctor, nurse or pharmacist before following any medical regimen to see if it is safe and effective for you.

## 2023-09-01 LAB
25(OH)D3 SERPL-MCNC: 18 NG/ML (ref 30–100)
ALBUMIN SERPL-MCNC: 4.4 G/DL (ref 3.6–5.1)
ALBUMIN/GLOB SERPL: 1.6 (CALC) (ref 1–2.5)
ALP SERPL-CCNC: 56 U/L (ref 31–125)
ALT SERPL-CCNC: 36 U/L (ref 6–29)
AST SERPL-CCNC: 22 U/L (ref 10–30)
BASOPHILS # BLD AUTO: 67 CELLS/UL (ref 0–200)
BASOPHILS NFR BLD AUTO: 0.8 %
BILIRUB SERPL-MCNC: 0.4 MG/DL (ref 0.2–1.2)
BUN SERPL-MCNC: 15 MG/DL (ref 7–25)
BUN/CREAT SERPL: ABNORMAL (CALC) (ref 6–22)
CALCIUM SERPL-MCNC: 9.5 MG/DL (ref 8.6–10.2)
CHLORIDE SERPL-SCNC: 104 MMOL/L (ref 98–110)
CHOLEST SERPL-MCNC: 190 MG/DL
CHOLEST/HDLC SERPL: 2.9 (CALC)
CO2 SERPL-SCNC: 25 MMOL/L (ref 20–32)
CREAT SERPL-MCNC: 0.8 MG/DL (ref 0.5–0.99)
EOSINOPHIL # BLD AUTO: 109 CELLS/UL (ref 15–500)
EOSINOPHIL NFR BLD AUTO: 1.3 %
ERYTHROCYTE [DISTWIDTH] IN BLOOD BY AUTOMATED COUNT: 13.1 % (ref 11–15)
GFR/BSA.PRED SERPLBLD CYS-BASED-ARV: 93 ML/MIN/1.73M2
GLOBULIN SER CALC-MCNC: 2.8 G/DL (CALC) (ref 1.9–3.7)
GLUCOSE SERPL-MCNC: 81 MG/DL (ref 65–99)
HCT VFR BLD AUTO: 43.2 % (ref 35–45)
HDLC SERPL-MCNC: 65 MG/DL
HGB BLD-MCNC: 14.1 G/DL (ref 11.7–15.5)
LDLC SERPL CALC-MCNC: 104 MG/DL (CALC)
LYMPHOCYTES # BLD AUTO: 2486 CELLS/UL (ref 850–3900)
LYMPHOCYTES NFR BLD AUTO: 29.6 %
MCH RBC QN AUTO: 30.2 PG (ref 27–33)
MCHC RBC AUTO-ENTMCNC: 32.6 G/DL (ref 32–36)
MCV RBC AUTO: 92.5 FL (ref 80–100)
MONOCYTES # BLD AUTO: 689 CELLS/UL (ref 200–950)
MONOCYTES NFR BLD AUTO: 8.2 %
NEUTROPHILS # BLD AUTO: 5048 CELLS/UL (ref 1500–7800)
NEUTROPHILS NFR BLD AUTO: 60.1 %
NONHDLC SERPL-MCNC: 125 MG/DL (CALC)
PLATELET # BLD AUTO: 327 THOUSAND/UL (ref 140–400)
PMV BLD REES-ECKER: 10 FL (ref 7.5–12.5)
POTASSIUM SERPL-SCNC: 4.4 MMOL/L (ref 3.5–5.3)
PROT SERPL-MCNC: 7.2 G/DL (ref 6.1–8.1)
RBC # BLD AUTO: 4.67 MILLION/UL (ref 3.8–5.1)
SODIUM SERPL-SCNC: 137 MMOL/L (ref 135–146)
T4 FREE SERPL-MCNC: 0.8 NG/DL (ref 0.8–1.8)
TRIGL SERPL-MCNC: 113 MG/DL
TSH SERPL-ACNC: 10.35 MIU/L
WBC # BLD AUTO: 8.4 THOUSAND/UL (ref 3.8–10.8)

## 2023-09-05 DIAGNOSIS — E55.9 VITAMIN D DEFICIENCY: Primary | ICD-10-CM

## 2023-09-05 RX ORDER — ERGOCALCIFEROL 1.25 MG/1
CAPSULE ORAL
Qty: 30 CAPSULE | Refills: 0 | Status: SHIPPED | OUTPATIENT
Start: 2023-09-05

## 2023-09-28 ENCOUNTER — OFFICE VISIT (OUTPATIENT)
Dept: FAMILY MEDICINE CLINIC | Facility: CLINIC | Age: 43
End: 2023-09-28
Payer: COMMERCIAL

## 2023-09-28 VITALS
TEMPERATURE: 97.4 F | OXYGEN SATURATION: 99 % | SYSTOLIC BLOOD PRESSURE: 112 MMHG | DIASTOLIC BLOOD PRESSURE: 90 MMHG | BODY MASS INDEX: 31.54 KG/M2 | HEIGHT: 63 IN | WEIGHT: 178 LBS | HEART RATE: 112 BPM

## 2023-09-28 DIAGNOSIS — E04.1 THYROID NODULE: ICD-10-CM

## 2023-09-28 DIAGNOSIS — Z12.4 SCREENING FOR CERVICAL CANCER: ICD-10-CM

## 2023-09-28 DIAGNOSIS — R20.0 BILATERAL HAND NUMBNESS: ICD-10-CM

## 2023-09-28 DIAGNOSIS — L83 ACANTHOSIS NIGRICANS: ICD-10-CM

## 2023-09-28 DIAGNOSIS — G44.209 TENSION HEADACHE: ICD-10-CM

## 2023-09-28 DIAGNOSIS — R79.89 ABNORMAL TSH: Primary | ICD-10-CM

## 2023-09-28 PROCEDURE — 99214 OFFICE O/P EST MOD 30 MIN: CPT | Performed by: FAMILY MEDICINE

## 2023-09-28 RX ORDER — TRIAMCINOLONE ACETONIDE 1 MG/G
OINTMENT TOPICAL
COMMUNITY
Start: 2023-09-08

## 2023-09-28 NOTE — PROGRESS NOTES
Subjective:    HPI  Neema Martinez is a 39 y.o. female here today for:  Chief Complaint   Patient presents with   • Follow-up     Patient was seen by dermatology and had a mole removed and they are concern with her having dark rebeca on her neck, and also when she swallow she feels like she has a marble on her throat and she still having tingling in her hands      . ---Above per clinical staff & reviewed.  ---  HPI:  38yof here for follow up to discuss mutliple issues   Thyroid labs discussed- will repeat in 4 weeks  Has been off 100mcg dose for several months, TSH slightly elevated  2019 ultrasound with thyroid nodule, pt has not had follow up since  Repeat ultrasound oredered  Bilateral hand numbness, intermittent, no exacerbating factors, goes away on its own, lasts a couple of minutes  Pt works at computer all day  Discussed carpel tunnel and cockup splints  Will get cervical xray  Headaches multiple times per week for more than a year  Has not been seen for this  Takes OTC meds for it  Starts at neck and goes to front and behind eyes  Sounds like tension HA- discussed posture, heat, NSAIDs, and healthy eatintg    The following portions of the patient's history were reviewed and updated as appropriate: allergies, current medications, past family history, past medical history, past social history, past surgical history and problem list.    Past Medical History:   Diagnosis Date   • Thyroid disease        Past Surgical History:   Procedure Laterality Date   •  SECTION     • CHOLECYSTECTOMY         Social History     Socioeconomic History   • Marital status: Single     Spouse name: None   • Number of children: None   • Years of education: None   • Highest education level: High school graduate   Occupational History   • None   Tobacco Use   • Smoking status: Never   • Smokeless tobacco: Never   Vaping Use   • Vaping Use: Never used   Substance and Sexual Activity   • Alcohol use: Not Currently   • Drug use: Never • Sexual activity: Yes     Birth control/protection: None   Other Topics Concern   • None   Social History Narrative    Lives at home with ,  for 3 years    2 grown children- all in [address]- 18yo son, 26yo son    Marilyn Ramirez old lives with father in [facility]    Work-  at [employer] recently from [employer] to [address] Strain: Not on [employer] Insecurity: Not on file   Transportation Needs: Not on file   Physical Activity: Not on file   Stress: Not on file   Social Connections: Not on file   Intimate Partner Violence: Not on file   Housing Stability: Not on file       Current Outpatient Medications   Medication Sig Dispense Refill   • ergocalciferol (VITAMIN D2) 50,000 units 1 tablet twice weekly until finished 30 capsule 0   • mupirocin (BACTROBAN) 2 % ointment APPLY A LIGHT LAYER TOPICALLY TO AFFECTED AREAS TWICE A DAY FOR 10 DAYS     • triamcinolone (KENALOG) 0.1 % ointment APPLY A LIGHT LAYER TOPIALLY TO AFFECTED, ITCHING AREAS TWICE A DAY FOR 2 WEEKS, STOP FOR 2 WEEKS BEFORE RESTARTING       No current facility-administered medications for this visit. Review of Systems   Constitutional: Negative. Negative for chills and fever. HENT: Negative. Negative for ear pain and sore throat. Eyes: Negative for pain and visual disturbance. Respiratory: Negative. Negative for cough and shortness of breath. Cardiovascular: Negative. Negative for chest pain and palpitations. Gastrointestinal: Negative. Negative for abdominal pain and vomiting. Genitourinary: Negative. Negative for dysuria and hematuria. Musculoskeletal: Negative for arthralgias and back pain. Skin: Positive for color change. Negative for rash. Neurological: Positive for numbness and headaches. Negative for seizures and syncope. Psychiatric/Behavioral: The patient is nervous/anxious.          Objective:    /90 (BP Location: Left arm, Patient Position: Sitting, Cuff Size: Adult)   Pulse (!) 112   Temp (!) 97.4 °F (36.3 °C) (Temporal)   Ht 5' 2.6" (1.59 m)   Wt 80.7 kg (178 lb)   LMP 08/27/2023 (Approximate)   SpO2 99%   BMI 31.94 kg/m²   Wt Readings from Last 3 Encounters:   09/28/23 80.7 kg (178 lb)   08/29/23 80.8 kg (178 lb 3.2 oz)   02/21/23 78.8 kg (173 lb 11.6 oz)     BP Readings from Last 3 Encounters:   09/28/23 112/90   08/29/23 110/82   08/03/23 134/89       Lab Results   Component Value Date    WBC 8.4 08/31/2023    HGB 14.1 08/31/2023    HCT 43.2 08/31/2023     08/31/2023    TRIG 113 08/31/2023    HDL 65 08/31/2023    ALT 36 (H) 08/31/2023    AST 22 08/31/2023    K 4.4 08/31/2023     08/31/2023    CREATININE 0.80 08/31/2023    BUN 15 08/31/2023    CO2 25 08/31/2023       Physical Exam  Vitals and nursing note reviewed. Constitutional:       Appearance: Normal appearance. She is well-developed. HENT:      Head: Normocephalic and atraumatic. Eyes:      Pupils: Pupils are equal, round, and reactive to light. Cardiovascular:      Rate and Rhythm: Normal rate and regular rhythm. Heart sounds: No murmur heard. Pulmonary:      Effort: Pulmonary effort is normal.      Breath sounds: Normal breath sounds. Musculoskeletal:      Cervical back: Normal range of motion and neck supple. Skin:     General: Skin is warm. Capillary Refill: Capillary refill takes less than 2 seconds. Comments: Mild acanthosis nigracans back of neck at hairline   Neurological:      General: No focal deficit present. Mental Status: She is alert and oriented to person, place, and time. Cranial Nerves: No cranial nerve deficit. Psychiatric:         Mood and Affect: Mood normal.         Thought Content: Thought content normal.                       Assessment/Plan:   Terrance Wheatley was seen today for follow-up.     Diagnoses and all orders for this visit:    Abnormal TSH  -     TSH, 3rd generation with Free T4 reflex; Future    Thyroid nodule  -     US thyroid; Future    Bilateral hand numbness  -     XR spine cervical complete 4 or 5 vw non injury; Future    Screening for cervical cancer  -     Ambulatory referral to Obstetrics / Gynecology; Future    Tension headache    Acanthosis nigricans      Return in about 3 months (around 12/28/2023) for Recheck. There are no Patient Instructions on file for this visit.

## 2023-10-18 ENCOUNTER — APPOINTMENT (OUTPATIENT)
Dept: RADIOLOGY | Facility: CLINIC | Age: 43
End: 2023-10-18
Payer: COMMERCIAL

## 2023-10-18 DIAGNOSIS — R20.0 BILATERAL HAND NUMBNESS: ICD-10-CM

## 2023-10-18 PROCEDURE — 72050 X-RAY EXAM NECK SPINE 4/5VWS: CPT

## 2023-10-24 ENCOUNTER — HOSPITAL ENCOUNTER (OUTPATIENT)
Dept: ULTRASOUND IMAGING | Facility: HOSPITAL | Age: 43
Discharge: HOME | End: 2023-10-24
Attending: FAMILY MEDICINE
Payer: COMMERCIAL

## 2023-10-24 DIAGNOSIS — E04.1 THYROID NODULE: ICD-10-CM

## 2023-10-24 PROCEDURE — 76536 US EXAM OF HEAD AND NECK: CPT

## 2023-11-03 ENCOUNTER — TELEPHONE (OUTPATIENT)
Dept: FAMILY MEDICINE CLINIC | Facility: CLINIC | Age: 43
End: 2023-11-03

## 2023-11-03 NOTE — TELEPHONE ENCOUNTER
Patient called asking if the labs you wanted her to get done is the TSH from 9/28 or if you meant to put in new labs to be done

## 2023-11-08 NOTE — TELEPHONE ENCOUNTER
Left detailed message for patient that the only blood work that she should get done is the thyroid test.

## 2023-11-16 LAB
T4 FREE SERPL-MCNC: 0.8 NG/DL (ref 0.8–1.8)
TSH SERPL-ACNC: 7.4 MIU/L

## 2023-11-22 ENCOUNTER — TELEPHONE (OUTPATIENT)
Dept: FAMILY MEDICINE CLINIC | Facility: CLINIC | Age: 43
End: 2023-11-22

## 2023-11-22 NOTE — TELEPHONE ENCOUNTER
Patient called in asking for her lab results. She can see them and is concerned about her TSH results.

## 2024-01-02 ENCOUNTER — OFFICE VISIT (OUTPATIENT)
Dept: FAMILY MEDICINE CLINIC | Facility: CLINIC | Age: 44
End: 2024-01-02
Payer: COMMERCIAL

## 2024-01-02 VITALS
BODY MASS INDEX: 31.01 KG/M2 | DIASTOLIC BLOOD PRESSURE: 76 MMHG | SYSTOLIC BLOOD PRESSURE: 128 MMHG | TEMPERATURE: 97.8 F | WEIGHT: 175 LBS | HEIGHT: 63 IN

## 2024-01-02 DIAGNOSIS — R20.0 BILATERAL HAND NUMBNESS: Primary | ICD-10-CM

## 2024-01-02 DIAGNOSIS — Z23 ENCOUNTER FOR IMMUNIZATION: ICD-10-CM

## 2024-01-02 DIAGNOSIS — M62.89 MUSCLE TIGHTNESS: ICD-10-CM

## 2024-01-02 DIAGNOSIS — R79.89 ABNORMAL TSH: ICD-10-CM

## 2024-01-02 PROCEDURE — 99214 OFFICE O/P EST MOD 30 MIN: CPT | Performed by: FAMILY MEDICINE

## 2024-01-02 PROCEDURE — 90471 IMMUNIZATION ADMIN: CPT

## 2024-01-02 PROCEDURE — 90686 IIV4 VACC NO PRSV 0.5 ML IM: CPT

## 2024-01-02 RX ORDER — METHOCARBAMOL 750 MG/1
750 TABLET, FILM COATED ORAL 2 TIMES DAILY PRN
Qty: 30 TABLET | Refills: 0 | Status: SHIPPED | OUTPATIENT
Start: 2024-01-02

## 2024-01-02 NOTE — PATIENT INSTRUCTIONS
Please get thyroid labs rechecked.  Schedule evaluation with PT.  Use Excedrin migraine for headaches.  Use heat and muscle relaxant as needed nightly.  Follow up in 3-4 weeks.    Low Carb Recommendations:  Please follow a low carbohydrate, high protein diet.  [employer] is a good dwight/computer program to keep food logs.    Your meals should be less than 30 grams of carbohydrates.    Your snacks should be less than 15 grams of carbohydrates.    You should drink at least 85 ounces of water daily.    You should walk at least 30 minutes daily.

## 2024-01-02 NOTE — PROGRESS NOTES
Subjective:    IGGY Ruiz is a 43 y.o. female here today for:  Chief Complaint   Patient presents with    Follow-up     3 month    .      ---Above per clinical staff & reviewed. ---  HPI:  43-year-old female here for follow-up.  Patient had repeat thyroid labs done in November which were actually improved from her ones over the summer.  Ultrasound reviewed, shows some thyroiditis but no discrete nodules.  We will repeat her without thyroid labs and keep close follow-up on them  Patient continues with headaches.  They seem to come rather frequently.  She does sometimes have vomiting with them.  Patient states that this has been happening for several years but she just did not want to see a doctor regarding this.  The headache seems to start in the back of her neck and go around to her eyes.  She also has hand numbness.  Her cervical x-ray was relatively normal.  I do think this might be tension headache and possibly related to muscle tightness in her cervical region.  Will refer to physical therapy.  I also recommended she use heat and muscle relaxant.  For headaches I advised her to take Excedrin Migraine at the onset of headaches to see if this helps.  Patient has lost 3 pounds.  I discussed low carb high-protein diet and lifting for muscles.  I did discuss possible MRI of her neck due to her hand numbness.  We will hold off and see if there is improvement with physical therapy.  I will see patient back in 3 to 4 weeks to see how her headaches are doing.  Flu shot today.    The following portions of the patient's history were reviewed and updated as appropriate: allergies, current medications, past family history, past medical history, past social history, past surgical history and problem list.    Past Medical History:   Diagnosis Date    Thyroid disease        Past Surgical History:   Procedure Laterality Date     SECTION      CHOLECYSTECTOMY         Social History     Socioeconomic History    Marital  status: Single     Spouse name: None    Number of children: None    Years of education: None    Highest education level: High school graduate   Occupational History    None   Tobacco Use    Smoking status: Never    Smokeless tobacco: Never   Vaping Use    Vaping status: Never Used   Substance and Sexual Activity    Alcohol use: Not Currently    Drug use: Never    Sexual activity: Yes     Birth control/protection: None   Other Topics Concern    None   Social History Narrative    Lives at home with ,  for 3 years    2 grown children- all in - 23yo son, 19yo son    16year old lives with father in [place]    Work-  at [employer]    Moved recently from [place] to [place]     Social Determinants of Health     Financial Resource Strain: Not on file   Food Insecurity: Not on file   Transportation Needs: Not on file   Physical Activity: Not on file   Stress: Not on file   Social Connections: Not on file   Intimate Partner Violence: Not on file   Housing Stability: Not on file       Current Outpatient Medications   Medication Sig Dispense Refill    methocarbamol (Robaxin-750) 750 mg tablet Take 1 tablet (750 mg total) by mouth 2 (two) times a day as needed for muscle spasms 30 tablet 0     No current facility-administered medications for this visit.        Review of Systems   Constitutional: Negative.  Negative for chills and fever.   HENT: Negative.  Negative for ear pain and sore throat.    Eyes:  Negative for pain and visual disturbance.   Respiratory: Negative.  Negative for cough and shortness of breath.    Cardiovascular: Negative.  Negative for chest pain and palpitations.   Gastrointestinal: Negative.  Negative for abdominal pain and vomiting.   Genitourinary: Negative.  Negative for dysuria and hematuria.   Musculoskeletal:  Positive for myalgias. Negative for arthralgias and back pain.   Skin:  Negative for color change and rash.   Neurological:  Positive for numbness and  "headaches. Negative for seizures and syncope.   Psychiatric/Behavioral: Negative.          Objective:    /76 (BP Location: Left arm, Patient Position: Sitting, Cuff Size: Standard)   Temp 97.8 °F (36.6 °C) (Temporal)   Ht 5' 2.6\" (1.59 m)   Wt 79.4 kg (175 lb)   BMI 31.40 kg/m²   Wt Readings from Last 3 Encounters:   01/02/24 79.4 kg (175 lb)   09/28/23 80.7 kg (178 lb)   08/29/23 80.8 kg (178 lb 3.2 oz)     BP Readings from Last 3 Encounters:   01/02/24 128/76   09/28/23 112/90   08/29/23 110/82       Lab Results   Component Value Date    WBC 8.4 08/31/2023    HGB 14.1 08/31/2023    HCT 43.2 08/31/2023     08/31/2023    TRIG 113 08/31/2023    HDL 65 08/31/2023    ALT 36 (H) 08/31/2023    AST 22 08/31/2023    K 4.4 08/31/2023     08/31/2023    CREATININE 0.80 08/31/2023    BUN 15 08/31/2023    CO2 25 08/31/2023       Physical Exam  Vitals and nursing note reviewed.   Constitutional:       Appearance: Normal appearance. She is well-developed.   HENT:      Head: Normocephalic and atraumatic.   Eyes:      Pupils: Pupils are equal, round, and reactive to light.   Cardiovascular:      Rate and Rhythm: Normal rate and regular rhythm.      Heart sounds: No murmur heard.  Pulmonary:      Effort: Pulmonary effort is normal.      Breath sounds: Normal breath sounds.   Musculoskeletal:         General: Tenderness present. Normal range of motion.      Cervical back: Normal range of motion and neck supple.   Skin:     General: Skin is warm.      Capillary Refill: Capillary refill takes less than 2 seconds.   Neurological:      Mental Status: She is alert and oriented to person, place, and time.      Cranial Nerves: No cranial nerve deficit.   Psychiatric:         Mood and Affect: Mood normal.         Thought Content: Thought content normal.               BMI Counseling: Body mass index is 31.4 kg/m². The BMI is above normal. Nutrition recommendations include moderation in carbohydrate intake and increasing " intake of lean protein. Exercise recommendations include moderate physical activity 150 minutes/week. No pharmacotherapy was ordered. Patient referred to PCP. Rationale for BMI follow-up plan is due to patient being overweight or obese.            Assessment/Plan:   Sara was seen today for follow-up.    Diagnoses and all orders for this visit:    Bilateral hand numbness  -     Cancel: Ambulatory Referral to Physical Therapy; Future  -     Ambulatory Referral to Physical Therapy; Future    Abnormal TSH  -     TSH, 3rd generation with Free T4 reflex; Future    Muscle tightness  -     methocarbamol (Robaxin-750) 750 mg tablet; Take 1 tablet (750 mg total) by mouth 2 (two) times a day as needed for muscle spasms  -     Ambulatory Referral to Physical Therapy; Future    Encounter for immunization  -     influenza vaccine, quadrivalent, 0.5 mL, preservative-free, for adult and pediatric patients 6 mos+ (AFLURIA, FLUARIX, FLULAVAL, FLUZONE)      Return in about 4 weeks (around 1/30/2024) for Recheck.  Patient Instructions   Please get thyroid labs rechecked.  Schedule evaluation with PT.  Use Excedrin migraine for headaches.  Use heat and muscle relaxant as needed nightly.  Follow up in 3-4 weeks.    Low Carb Recommendations:  Please follow a low carbohydrate, high protein diet.  [employer] is a good dwight/computer program to keep food logs.    Your meals should be less than 30 grams of carbohydrates.    Your snacks should be less than 15 grams of carbohydrates.    You should drink at least 85 ounces of water daily.    You should walk at least 30 minutes daily.

## 2024-01-03 ENCOUNTER — EVALUATION (OUTPATIENT)
Dept: PHYSICAL THERAPY | Facility: CLINIC | Age: 44
End: 2024-01-03
Payer: COMMERCIAL

## 2024-01-03 DIAGNOSIS — R20.0 BILATERAL HAND NUMBNESS: ICD-10-CM

## 2024-01-03 DIAGNOSIS — M62.89 MUSCLE TIGHTNESS: ICD-10-CM

## 2024-01-03 PROCEDURE — 97161 PT EVAL LOW COMPLEX 20 MIN: CPT

## 2024-01-03 PROCEDURE — 97110 THERAPEUTIC EXERCISES: CPT

## 2024-01-03 NOTE — PROGRESS NOTES
PT Evaluation     Today's date: 1/3/2024  Patient name: Sara Lomax  : 1980  MRN: [id number]  Referring provider: Elizabeth Hammond MD  Dx:   Encounter Diagnosis     ICD-10-CM    1. Bilateral hand numbness  R20.0 Ambulatory Referral to Physical Therapy      2. Muscle tightness  M62.89 Ambulatory Referral to Physical Therapy          Start Time: 0730  Stop Time: 0800  Total time in clinic (min): 30 minutes    Assessment  Assessment details: Patient is a 44 y/o female presenting to PT with complaints of chronic headaches beginning several years ago and B hand numbness beginning 1 year ago. No red flags noted. Upon clinical exam, patient presents with decreased cervical spine ROM, decreased cervical muscle flexibility, abnormal posture, and decreased scapular stability. These impairments limit the patient with turning her head, working, and performing ADLs. Exam findings and clinical signs and symptoms are suggestive of cervicogenic headaches. Patient will benefit from physical therapy to address the above impairments and maximize functional mobility.     Impairments: abnormal muscle firing, abnormal or restricted ROM, abnormal movement, activity intolerance, impaired physical strength, lacks appropriate home exercise program, pain with function, poor posture  and poor body mechanics    Symptom irritability: highUnderstanding of Dx/Px/POC: good   Prognosis: good    Goals  STG - to be met by week 4  1. Patient will be independent with HEP throughout therapy to prepare for eventual transition to maintenance program.  2. Patient will improve subjective pain to <=6/10 at worst to improve QOL.  3. Patient will improve cervical AROM to grossly WFL without pain to perform ADLs with less difficulty.  4. Patient will demonstrate decreased headache frequency to 1-2x/week to improve QOL.     LTG - to be met by discharge   1. Patient will improve B scapular strength to >=4/5 to improve functional mobility.  2. Patient  will demonstrate improved awareness and correction of sitting posture.  3. Patient will improve FOTO score to age matched prediction to demonstrate functional improvements.  4. Patient will demonstrate decreased headache frequency to 0-1x/week to improve QOL.         Plan  Patient would benefit from: skilled physical therapy  Referral necessary: No  Planned modality interventions: cryotherapy and thermotherapy: hydrocollator packs  Planned therapy interventions: activity modification, body mechanics training, flexibility, functional ROM exercises, graded activity, graded exercise, home exercise program, joint mobilization, manual therapy, neuromuscular re-education, patient education, postural training, strengthening, stretching, therapeutic activities and therapeutic exercise  Frequency: 2x week  Duration in weeks: 10  Treatment plan discussed with: patient        Subjective Evaluation    History of Present Illness  Date of onset: 1/3/2023  Mechanism of injury: Patient reports she has been having chronic headaches for several years and B hand tingling beginning 1 year ago. Patient reports she does not know what causes her headaches, but notes her B hand tingling occurs when she has a headache. Reports the headaches start in the back of her head and neck and go around to her eyes. Reports she gets nausea, vomiting, light sensitivity, and blurry vision with her headaches and the headaches occur 2-3x/week. Reports Tylenol, ice packs, and sleep helps with her headaches. Patient reports difficulty with turning her head, working, and performing ADLs due to her headaches. Reports her doctor told to take Excedrin migraine and a muscle relaxer which she needs to  from the pharmacy. Reports she has an MD follow up in 3 weeks to discuss further imaging or medication changes.             Recurrent probem    Quality of life: good    Patient Goals  Patient goals for therapy: decreased pain, increased motion, increased  strength and independence with ADLs/IADLs  Patient goal: headaches to go away  Pain  Current pain ratin  At best pain ratin  At worst pain rating: 10  Location: Head  Quality: tight and pressure  Relieving factors: ice and relaxation  Progression: worsening    Social Support    Employment status: working    Diagnostic Tests  X-ray: abnormal (Mild straightening. Otherwise unremarkable cervical spine.)  Treatments  Current treatment: physical therapy        Objective     Postural Observations  Seated posture: fair  Standing posture: fair  Correction of posture: makes symptoms better    Additional Postural Observation Details  Forward head, rounded shoulders posture    Palpation     Additional Palpation Details  TTP B suboccipitals, cervical paraspinals, upper traps, levator scapula    Neurological Testing     Additional Neurological Details  BUE sensation intact to light touch    Active Range of Motion   Cervical/Thoracic Spine       Cervical    Flexion:  with pain Restriction level: moderate  Extension:  WFL  Left lateral flexion:  Restriction level: minimal  Right lateral flexion:  Restriction level minimal  Left rotation:  WFL and with pain  Right rotation:  WFL and with pain    Thoracic    Extension:  Restriction level: moderate    Joint Play   Joints within functional limits: C1, C2, C3, C4, C5, C6 and C7     Pain: C1, C2, C3, C4, C5, C6 and C7     Tests   Cervical   Negative cervical distraction.     Left Shoulder   Negative ULTT1, ULTT3 and ULTT4.     Right Shoulder   Negative ULTT1, ULTT3 and ULTT4.     Additional Tests Details  No change in B hand tingling with cervical distraction    General Comments:    Upper quarter screen   Hand/wrist: unremarkable    Wrist/Hand Comments  Negative Phalen and reverse Phalen tests       Flowsheet Rows      Flowsheet Row Most Recent Value   PT/OT G-Codes    Current Score 65   Projected Score 71               Precautions: none    Education: use of heat, sitting  posture, HEP, POC    Manuals 1/3            Cervical STM             Manual cervical traction/SOR             Thoracic mobs             Neuro Re-Ed             Chin tucks HEP            Scapular retractions HEP            Scap 4             Prone low trap I             Low trap lift             Wall angles                          Ther Ex             FR thoracic ext             UT stretch HEP            Levator stretch HEP                                                                             Ther Activity                                       Gait Training                                       Modalities             MHP prn

## 2024-01-05 ENCOUNTER — OFFICE VISIT (OUTPATIENT)
Dept: PHYSICAL THERAPY | Facility: CLINIC | Age: 44
End: 2024-01-05
Payer: COMMERCIAL

## 2024-01-05 DIAGNOSIS — R20.0 BILATERAL HAND NUMBNESS: ICD-10-CM

## 2024-01-05 DIAGNOSIS — M62.89 MUSCLE TIGHTNESS: Primary | ICD-10-CM

## 2024-01-05 PROCEDURE — 97110 THERAPEUTIC EXERCISES: CPT

## 2024-01-05 PROCEDURE — 97112 NEUROMUSCULAR REEDUCATION: CPT

## 2024-01-05 PROCEDURE — 97140 MANUAL THERAPY 1/> REGIONS: CPT

## 2024-01-05 NOTE — PROGRESS NOTES
"Daily Note     Today's date: 2024  Patient name: Sara Lomax  : 1980  MRN: [id number]  Referring provider: lEizabeth Hammond MD  Dx:   Encounter Diagnosis     ICD-10-CM    1. Muscle tightness  M62.89       2. Bilateral hand numbness  R20.0           Start Time: 0700  Stop Time: 0740  Total time in clinic (min): 40 minutes    Subjective: Patient reports she does not have a headache or B hand tingling currently, but she did yesterday. Reports her headache yesterday was not severe and using the Excedrin helped quicker than Aleve. Reports she has been doing her HEP and bought a hot pack.      Objective: See treatment diary below      Assessment: Tolerated treatment well. Patient demonstrated fatigue post treatment, exhibited good technique with therapeutic exercises, and would benefit from continued PT. Reviewed HEP exercises with good understanding noted. Good tolerance to initiation of exercise program with focus on improving scapular and postural muscle stability. Good tolerance to cervical and thoracic flexibility exercises. Plan to further address thoracic ext mobility next visit.      Plan: Continue per plan of care.      Precautions: none      Manuals 1/3 1           Cervical STM  MK           Manual cervical traction/SOR  MK           Thoracic mobs             Neuro Re-Ed             Chin tucks HEP Supine 5\"x20           Scapular retractions HEP            Scap 4  Rows blue 5\"x20, Ext grn 5\"x20           Prone low trap I             Low trap lift  5\"x15           Wall angles  x15                        Ther Ex             FR thoracic ext  5\"x15           UT stretch HEP 3x10\" ea           Levator stretch HEP 3x10\" ea           Supine thoracic ext             Foam roll protocol                                                    Ther Activity                                       Gait Training                                       Modalities             MHP prn  8 min post                           "

## 2024-01-09 LAB
T4 FREE SERPL-MCNC: 0.8 NG/DL (ref 0.8–1.8)
TSH SERPL-ACNC: 12.67 MIU/L

## 2024-01-17 ENCOUNTER — OFFICE VISIT (OUTPATIENT)
Dept: PHYSICAL THERAPY | Facility: CLINIC | Age: 44
End: 2024-01-17
Payer: COMMERCIAL

## 2024-01-17 DIAGNOSIS — M62.89 MUSCLE TIGHTNESS: Primary | ICD-10-CM

## 2024-01-17 DIAGNOSIS — R20.0 BILATERAL HAND NUMBNESS: ICD-10-CM

## 2024-01-17 PROCEDURE — 97112 NEUROMUSCULAR REEDUCATION: CPT

## 2024-01-17 PROCEDURE — 97140 MANUAL THERAPY 1/> REGIONS: CPT

## 2024-01-17 PROCEDURE — 97110 THERAPEUTIC EXERCISES: CPT

## 2024-01-17 NOTE — PROGRESS NOTES
"Daily Note     Today's date: 2024  Patient name: Sara Lomax  : 1980  MRN: [id number]  Referring provider: Elizabeth Hammond MD  Dx:   Encounter Diagnosis     ICD-10-CM    1. Muscle tightness  M62.89       2. Bilateral hand numbness  R20.0             Start Time: 0700  Stop Time: 0745  Total time in clinic (min): 45 minutes    Subjective: Patient reports she had a severe headache yesterday and a minor headache . Reports she is doing her HEP at work and at home which helps with the tightness. Reports more tightness and pain on her R side.      Objective: See treatment diary below      Assessment: Tolerated treatment well. Patient demonstrated fatigue post treatment, exhibited good technique with therapeutic exercises, and would benefit from continued PT. Increased muscle tension noted in her R>L cervical musculature with relief noted following manuals. Progressed scapular stability this session with prone I's and T's with good tolerance noted. Also further addressed decreased thoracic mobility with open books and thoracic extension AROM. Updated HEP with good understanding noted.      Plan: Continue per plan of care.      Precautions: none      Manuals 1/3 1          Cervical STM  MK MK          Manual cervical traction/SOR  MK MK          Thoracic mobs             Neuro Re-Ed             Chin tucks HEP Supine 5\"x20 Supine 5\"x20          Scapular retractions HEP            Scap 4  Rows blue 5\"x20, Ext grn 5\"x20           Prone low trap I    5\"x20           Low trap lift  5\"x15           Wall angles  x15           Prone T   5\"x20          Ther Ex             FR thoracic ext  5\"x15           UT stretch HEP 3x10\" ea 3x10\" ea          Levator stretch HEP 3x10\" ea 3x10\" ea          Seated thoracic ext   10x10\"          Foam roll protocol             Open books   10\"x10 ea                                    Ther Activity                                       Gait Training                        "                Modalities             MHP prn  8 min post

## 2024-01-19 ENCOUNTER — APPOINTMENT (OUTPATIENT)
Dept: PHYSICAL THERAPY | Facility: CLINIC | Age: 44
End: 2024-01-19
Payer: COMMERCIAL

## 2024-01-23 ENCOUNTER — TELEPHONE (OUTPATIENT)
Dept: FAMILY MEDICINE CLINIC | Facility: CLINIC | Age: 44
End: 2024-01-23

## 2024-01-26 ENCOUNTER — APPOINTMENT (OUTPATIENT)
Dept: PHYSICAL THERAPY | Facility: CLINIC | Age: 44
End: 2024-01-26
Payer: COMMERCIAL

## 2024-01-31 ENCOUNTER — OFFICE VISIT (OUTPATIENT)
Dept: PHYSICAL THERAPY | Facility: CLINIC | Age: 44
End: 2024-01-31
Payer: COMMERCIAL

## 2024-01-31 DIAGNOSIS — M62.89 MUSCLE TIGHTNESS: Primary | ICD-10-CM

## 2024-01-31 DIAGNOSIS — R20.0 BILATERAL HAND NUMBNESS: ICD-10-CM

## 2024-01-31 PROCEDURE — 97112 NEUROMUSCULAR REEDUCATION: CPT

## 2024-01-31 PROCEDURE — 97110 THERAPEUTIC EXERCISES: CPT

## 2024-01-31 PROCEDURE — 97140 MANUAL THERAPY 1/> REGIONS: CPT

## 2024-01-31 NOTE — PROGRESS NOTES
"Daily Note     Today's date: 2024  Patient name: Sara Lomax  : 1980  MRN: [id number]  Referring provider: Elizabeth Hammond MD  Dx:   Encounter Diagnosis     ICD-10-CM    1. Muscle tightness  M62.89       2. Bilateral hand numbness  R20.0             Start Time: 0710  Stop Time: 0745  Total time in clinic (min): 35 minutes    Subjective: Patient reports she continues to have headaches averaging about 3x a week that radiates around temples and forehead.       Objective: See treatment diary below      Assessment: Tolerated treatment well despite being 10 min late today. Patient responded well to manuals to cervical region; she continues to display muscle tension on R>L to suboccipital region down to UT with noted relief post treatment. Patient benefits from verbal cues during scapular exercise to increase recruitment of scapular muscle vs arms  to improve intended muscular recruitment with good response. Patient would benefit from continued PT to improve ROM, strength and flexibility to optimize return to prior functional mobility.      Plan: Continue per plan of care.      Precautions: none      Manuals 1/3 1/5 1/17 1/31         Cervical STM  [facility]         Manual cervical traction/SOR  [facility]         Thoracic mobs             Neuro Re-Ed             Chin tucks HEP Supine 5\"x20 Supine 5\"x20 Supine 5\"x20         Scapular retractions HEP            Scap 4  Rows blue 5\"x20, Ext grn 5\"x20           Prone low trap I    5\"x20  5\"x20          Low trap lift  5\"x15           Wall angles  x15           Prone T   5\"x20 5\"x20         Ther Ex             FR thoracic ext  5\"x15           UT stretch HEP 3x10\" ea 3x10\" ea 3x10\" ea         Levator stretch HEP 3x10\" ea 3x10\" ea 3x10\" ea         Seated thoracic ext   10x10\" 10x10         Foam roll protocol             Open books   10\"x10 ea 5\"x5   (Time)                                   Ther Activity                                       Gait Training               "                         Modalities             MHP prn  8 min post

## 2024-02-01 ENCOUNTER — OFFICE VISIT (OUTPATIENT)
Dept: FAMILY MEDICINE CLINIC | Facility: CLINIC | Age: 44
End: 2024-02-01
Payer: COMMERCIAL

## 2024-02-01 VITALS
HEART RATE: 112 BPM | SYSTOLIC BLOOD PRESSURE: 128 MMHG | DIASTOLIC BLOOD PRESSURE: 82 MMHG | TEMPERATURE: 98 F | OXYGEN SATURATION: 99 % | HEIGHT: 63 IN | WEIGHT: 174.4 LBS | BODY MASS INDEX: 30.9 KG/M2

## 2024-02-01 DIAGNOSIS — H53.8 BLURRY VISION: ICD-10-CM

## 2024-02-01 DIAGNOSIS — E07.9 THYROID DISEASE: ICD-10-CM

## 2024-02-01 DIAGNOSIS — R51.9 INTRACTABLE HEADACHE, UNSPECIFIED CHRONICITY PATTERN, UNSPECIFIED HEADACHE TYPE: ICD-10-CM

## 2024-02-01 DIAGNOSIS — G44.209 TENSION HEADACHE: Primary | ICD-10-CM

## 2024-02-01 DIAGNOSIS — M62.89 MUSCLE TIGHTNESS: ICD-10-CM

## 2024-02-01 PROCEDURE — 99214 OFFICE O/P EST MOD 30 MIN: CPT | Performed by: FAMILY MEDICINE

## 2024-02-01 RX ORDER — LEVOTHYROXINE SODIUM 0.05 MG/1
50 TABLET ORAL DAILY
COMMUNITY
Start: 2024-01-24

## 2024-02-01 RX ORDER — RIZATRIPTAN BENZOATE 10 MG/1
10 TABLET, ORALLY DISINTEGRATING ORAL ONCE AS NEEDED
Qty: 10 TABLET | Refills: 5 | Status: SHIPPED | OUTPATIENT
Start: 2024-02-01

## 2024-02-01 RX ORDER — ACETAMINOPHEN 325 MG/1
650 TABLET ORAL EVERY 6 HOURS PRN
COMMUNITY

## 2024-02-01 NOTE — PROGRESS NOTES
Subjective:    IGGY Ruiz is a 43 y.o. female here today for:  Chief Complaint   Patient presents with    Follow-up     4 week    .      ---Above per clinical staff & reviewed. ---  HPI:  43yof here for follow up  Seen by endocrine- TSH increased and started on thyroids meds, just started yesterday  Still having headaches, doing well in PT- PT feels she would benefit from continued PT due to muscle tightness  Using tylenol and Excedrin migraine for headaches  Getting headaches frequently but not debilitating- states sometimes 2 times weekly  Headaches have been going on for years per pt but 'tolerates it'  Has not had any office visits for headaches except for an ER visit.  No neurological signs with her headache, but there have been instances of blurry vision.  Will order CT scan.  Recommended patient check with insurance regarding any charges.  Will also try Maxalt for headache.  Discussed healthy eating and watching sugar intake.  Also recommended increasing water intake and walking 30 minutes daily.    The following portions of the patient's history were reviewed and updated as appropriate: allergies, current medications, past family history, past medical history, past social history, past surgical history and problem list.    Past Medical History:   Diagnosis Date    Thyroid disease 2024       Past Surgical History:   Procedure Laterality Date     SECTION      CHOLECYSTECTOMY         Social History     Socioeconomic History    Marital status: Single     Spouse name: None    Number of children: None    Years of education: None    Highest education level: High school graduate   Occupational History    None   Tobacco Use    Smoking status: Never    Smokeless tobacco: Never   Vaping Use    Vaping status: Never Used   Substance and Sexual Activity    Alcohol use: Not Currently    Drug use: Never    Sexual activity: Yes     Birth control/protection: None   Other Topics Concern    None   Social History  Narrative    Lives at home with ,  for 3 years    2 grown children- all in - 21yo son, 19yo son    16year old lives with father in [place]    Work-  at [employer]    Moved recently from [place] to [place]     Social Determinants of Health     Financial Resource Strain: Not on file   Food Insecurity: Not on file   Transportation Needs: Not on file   Physical Activity: Not on file   Stress: Not on file   Social Connections: Not on file   Intimate Partner Violence: Not on file   Housing Stability: Not on file       Current Outpatient Medications   Medication Sig Dispense Refill    acetaminophen (TYLENOL) 325 mg tablet Take 650 mg by mouth every 6 (six) hours as needed      levothyroxine 50 mcg tablet Take 50 mcg by mouth daily      rizatriptan (MAXALT-MLT) 10 mg disintegrating tablet Take 1 tablet (10 mg total) by mouth once as needed for migraine for up to 1 dose may repeat in 2 hours if necessary 10 tablet 5     No current facility-administered medications for this visit.        Review of Systems   Constitutional: Negative.  Negative for chills and fever.   HENT: Negative.  Negative for ear pain and sore throat.    Eyes:  Positive for visual disturbance. Negative for pain.   Respiratory: Negative.  Negative for cough and shortness of breath.    Cardiovascular: Negative.  Negative for chest pain and palpitations.   Gastrointestinal: Negative.  Negative for abdominal pain and vomiting.   Genitourinary: Negative.  Negative for dysuria and hematuria.   Musculoskeletal:  Negative for arthralgias and back pain.   Skin:  Negative for color change and rash.   Neurological:  Positive for headaches. Negative for seizures and syncope.   Psychiatric/Behavioral:  Positive for sleep disturbance.    All other systems reviewed and are negative.       Objective:    /82 (BP Location: Left arm, Patient Position: Sitting, Cuff Size: Standard)   Pulse (!) 112   Temp 98 °F (36.7 °C)  "(Temporal)   Ht 5' 2.6\" (1.59 m)   Wt 79.1 kg (174 lb 6.4 oz)   SpO2 99%   BMI 31.29 kg/m²   Wt Readings from Last 3 Encounters:   02/01/24 79.1 kg (174 lb 6.4 oz)   01/02/24 79.4 kg (175 lb)   09/28/23 80.7 kg (178 lb)     BP Readings from Last 3 Encounters:   02/01/24 128/82   01/02/24 128/76   09/28/23 112/90       Lab Results   Component Value Date    WBC 8.4 08/31/2023    HGB 14.1 08/31/2023    HCT 43.2 08/31/2023     08/31/2023    TRIG 113 08/31/2023    HDL 65 08/31/2023    ALT 36 (H) 08/31/2023    AST 22 08/31/2023    K 4.4 08/31/2023     08/31/2023    CREATININE 0.80 08/31/2023    BUN 15 08/31/2023    CO2 25 08/31/2023       Physical Exam  Vitals and nursing note reviewed.   Constitutional:       Appearance: Normal appearance. She is well-developed.   HENT:      Head: Normocephalic and atraumatic.   Eyes:      Pupils: Pupils are equal, round, and reactive to light.   Cardiovascular:      Rate and Rhythm: Normal rate and regular rhythm.      Heart sounds: No murmur heard.  Pulmonary:      Effort: Pulmonary effort is normal.      Breath sounds: Normal breath sounds.   Musculoskeletal:      Cervical back: Normal range of motion and neck supple.   Skin:     General: Skin is warm.      Capillary Refill: Capillary refill takes less than 2 seconds.   Neurological:      General: No focal deficit present.      Mental Status: She is alert and oriented to person, place, and time. Mental status is at baseline.      Cranial Nerves: No cranial nerve deficit.   Psychiatric:         Mood and Affect: Mood normal.         Thought Content: Thought content normal.                       Assessment/Plan:   Sara was seen today for follow-up.    Diagnoses and all orders for this visit:    Tension headache  -     rizatriptan (MAXALT-MLT) 10 mg disintegrating tablet; Take 1 tablet (10 mg total) by mouth once as needed for migraine for up to 1 dose may repeat in 2 hours if necessary  -     CT head wo contrast; " Future    Thyroid disease    Muscle tightness    Intractable headache, unspecified chronicity pattern, unspecified headache type  -     CT head wo contrast; Future    Blurry vision  -     CT head wo contrast; Future      Return in about 2 months (around 4/1/2024) for Recheck.  Patient Instructions   Use Maxalt as needed for headaches.  Make sure to be eating healthy with low-carb low sugar.  Make sure to increase water intake.  Please walk 30 minutes daily.  Please schedule CAT scan of head.  Would recommend checking with insurance prior for pricing.  Follow-up in 4 to 6 weeks.

## 2024-02-01 NOTE — PATIENT INSTRUCTIONS
Use Maxalt as needed for headaches.  Make sure to be eating healthy with low-carb low sugar.  Make sure to increase water intake.  Please walk 30 minutes daily.  Please schedule CAT scan of head.  Would recommend checking with insurance prior for pricing.  Follow-up in 4 to 6 weeks.

## 2024-02-06 ENCOUNTER — OFFICE VISIT (OUTPATIENT)
Dept: PHYSICAL THERAPY | Facility: CLINIC | Age: 44
End: 2024-02-06
Payer: COMMERCIAL

## 2024-02-06 DIAGNOSIS — R20.0 BILATERAL HAND NUMBNESS: ICD-10-CM

## 2024-02-06 DIAGNOSIS — M62.89 MUSCLE TIGHTNESS: Primary | ICD-10-CM

## 2024-02-06 PROCEDURE — 97112 NEUROMUSCULAR REEDUCATION: CPT

## 2024-02-06 PROCEDURE — 97110 THERAPEUTIC EXERCISES: CPT

## 2024-02-06 PROCEDURE — 97140 MANUAL THERAPY 1/> REGIONS: CPT

## 2024-02-06 NOTE — PROGRESS NOTES
"Daily Note     Today's date: 2024  Patient name: Sara Lomax  : 1980  MRN: [id number]  Referring provider: Elizabeth Hammond MD  Dx:   Encounter Diagnosis     ICD-10-CM    1. Muscle tightness  M62.89       2. Bilateral hand numbness  R20.0                      Subjective: Patient reports starting new medication for headaches, still getting headaches fairly frequently but instructed to continue with PT.       Objective: See treatment diary below      Assessment: Tolerated treatment well. Continued with periscapular strengthening and thoracic mobility. Improvements in cervical mobility following stretching, decreased pulling in right upper trap. Encouraged to continue with seated cervical retractions while at work to help address postural deficits.  Patient would benefit from continued PT.      Plan: Progress treatment as tolerated.       Precautions: none      Manuals 1/3 1/5 1/17 1/31 2/6        Cervical STM  [facility]        Manual cervical traction/SOR  [facility]        Thoracic mobs             Neuro Re-Ed             Chin tucks HEP Supine 5\"x20 Supine 5\"x20 Supine 5\"x20 Supine 20x5\",  Seated 2x10 patient OP        Scapular retractions HEP            Scap 4  Rows blue 5\"x20, Ext grn 5\"x20           Prone low trap I    5\"x20  5\"x20  20x5\"        Low trap lift  5\"x15           Wall angles  x15           Prone T   5\"x20 5\"x20 20x5\"        Ther Ex             FR thoracic ext  5\"x15           UT stretch HEP 3x10\" ea 3x10\" ea 3x10\" ea 3x30\" R UT        Levator stretch HEP 3x10\" ea 3x10\" ea 3x10\" ea         Seated thoracic ext   10x10\" 10x10         Foam roll protocol             Open books   10\"x10 ea 5\"x5   (Time)         FR thoracic ext table slide     20x5\"                     Ther Activity                                       Gait Training                                       Modalities             MHP prn  8 min post                               "

## 2024-02-09 ENCOUNTER — APPOINTMENT (OUTPATIENT)
Dept: PHYSICAL THERAPY | Facility: CLINIC | Age: 44
End: 2024-02-09
Payer: COMMERCIAL

## 2024-02-11 ENCOUNTER — HOSPITAL ENCOUNTER (OUTPATIENT)
Dept: CT IMAGING | Facility: HOSPITAL | Age: 44
Discharge: HOME | End: 2024-02-11
Attending: FAMILY MEDICINE
Payer: COMMERCIAL

## 2024-02-11 DIAGNOSIS — G44.209 TENSION HEADACHE: ICD-10-CM

## 2024-02-11 DIAGNOSIS — R51.9 INTRACTABLE HEADACHE, UNSPECIFIED CHRONICITY PATTERN, UNSPECIFIED HEADACHE TYPE: ICD-10-CM

## 2024-02-11 DIAGNOSIS — H53.8 BLURRY VISION: ICD-10-CM

## 2024-02-11 PROCEDURE — G1004 CDSM NDSC: HCPCS

## 2024-02-11 PROCEDURE — 70450 CT HEAD/BRAIN W/O DYE: CPT

## 2024-02-19 ENCOUNTER — TELEPHONE (OUTPATIENT)
Age: 44
End: 2024-02-19

## 2024-02-19 NOTE — TELEPHONE ENCOUNTER
Pt called asking for the provider to review her CT scan of her head. Please review and contact pt back.

## 2024-02-21 NOTE — TELEPHONE ENCOUNTER
I recommend pt see ENT for her sinuses. The imaging of her brain is normal  I usually recommend Dr Parr's office [place] she can whoever she prefers depending on her insurance. We can place a referral once she decided who she will see if needed.

## 2024-02-26 NOTE — TELEPHONE ENCOUNTER
Patient returning call to review testing results. Warm transfer successful to [facility] at [facility] for further assistance.

## 2024-02-26 NOTE — TELEPHONE ENCOUNTER
"Received call from patient to review CT results.  Gave her the following information per Dr. Hammond: \"  I recommend pt see ENT for her sinuses. The imaging of her brain is normal  I usually recommend Dr Parr's office [place] she can whoever she prefers depending on her insurance. We can place a referral once she decided who she will see if needed.       Patient would like a referral placed for ENT. She is OK with seeing Dr. Parr.  "

## 2024-02-29 DIAGNOSIS — J34.89 SINUS PAIN: Primary | ICD-10-CM

## 2024-02-29 DIAGNOSIS — G44.209 TENSION HEADACHE: ICD-10-CM
